# Patient Record
Sex: MALE | Race: WHITE | NOT HISPANIC OR LATINO | Employment: FULL TIME | URBAN - METROPOLITAN AREA
[De-identification: names, ages, dates, MRNs, and addresses within clinical notes are randomized per-mention and may not be internally consistent; named-entity substitution may affect disease eponyms.]

---

## 2019-02-01 ENCOUNTER — NON-PROVIDER VISIT (OUTPATIENT)
Dept: URGENT CARE | Facility: CLINIC | Age: 34
End: 2019-02-01
Payer: COMMERCIAL

## 2019-02-01 DIAGNOSIS — Z02.1 PRE-EMPLOYMENT DRUG SCREENING: ICD-10-CM

## 2019-02-01 LAB
AMP AMPHETAMINE: NORMAL
COC COCAINE: NORMAL
INT CON NEG: NORMAL
INT CON POS: NORMAL
MET METHAMPHETAMINES: NORMAL
OPI OPIATES: NORMAL
PCP PHENCYCLIDINE: NORMAL
POC DRUG COMMENT 753798-OCCUPATIONAL HEALTH: NEGATIVE
THC: NORMAL

## 2019-02-01 PROCEDURE — 80305 DRUG TEST PRSMV DIR OPT OBS: CPT | Performed by: PHYSICIAN ASSISTANT

## 2019-07-06 ENCOUNTER — APPOINTMENT (OUTPATIENT)
Dept: RADIOLOGY | Facility: MEDICAL CENTER | Age: 34
DRG: 153 | End: 2019-07-06
Attending: EMERGENCY MEDICINE
Payer: COMMERCIAL

## 2019-07-06 ENCOUNTER — HOSPITAL ENCOUNTER (INPATIENT)
Facility: MEDICAL CENTER | Age: 34
LOS: 1 days | DRG: 153 | End: 2019-07-07
Attending: EMERGENCY MEDICINE | Admitting: FAMILY MEDICINE
Payer: COMMERCIAL

## 2019-07-06 LAB
BASOPHILS # BLD AUTO: 0.3 % (ref 0–1.8)
BASOPHILS # BLD: 0.06 K/UL (ref 0–0.12)
EOSINOPHIL # BLD AUTO: 0.02 K/UL (ref 0–0.51)
EOSINOPHIL NFR BLD: 0.1 % (ref 0–6.9)
ERYTHROCYTE [DISTWIDTH] IN BLOOD BY AUTOMATED COUNT: 39.3 FL (ref 35.9–50)
HCT VFR BLD AUTO: 39.3 % (ref 42–52)
HGB BLD-MCNC: 14 G/DL (ref 14–18)
IMM GRANULOCYTES # BLD AUTO: 0.15 K/UL (ref 0–0.11)
IMM GRANULOCYTES NFR BLD AUTO: 0.8 % (ref 0–0.9)
LYMPHOCYTES # BLD AUTO: 1.1 K/UL (ref 1–4.8)
LYMPHOCYTES NFR BLD: 5.6 % (ref 22–41)
MCH RBC QN AUTO: 31.1 PG (ref 27–33)
MCHC RBC AUTO-ENTMCNC: 35.6 G/DL (ref 33.7–35.3)
MCV RBC AUTO: 87.3 FL (ref 81.4–97.8)
MONOCYTES # BLD AUTO: 2.33 K/UL (ref 0–0.85)
MONOCYTES NFR BLD AUTO: 11.9 % (ref 0–13.4)
NEUTROPHILS # BLD AUTO: 15.94 K/UL (ref 1.82–7.42)
NEUTROPHILS NFR BLD: 81.3 % (ref 44–72)
NRBC # BLD AUTO: 0 K/UL
NRBC BLD-RTO: 0 /100 WBC
PLATELET # BLD AUTO: 342 K/UL (ref 164–446)
PMV BLD AUTO: 9.6 FL (ref 9–12.9)
RBC # BLD AUTO: 4.5 M/UL (ref 4.7–6.1)
WBC # BLD AUTO: 19.6 K/UL (ref 4.8–10.8)

## 2019-07-06 PROCEDURE — 96365 THER/PROPH/DIAG IV INF INIT: CPT

## 2019-07-06 PROCEDURE — 85025 COMPLETE CBC W/AUTO DIFF WBC: CPT

## 2019-07-06 PROCEDURE — 10160 PNXR ASPIR ABSC HMTMA BULLA: CPT

## 2019-07-06 PROCEDURE — 700111 HCHG RX REV CODE 636 W/ 250 OVERRIDE (IP): Performed by: EMERGENCY MEDICINE

## 2019-07-06 PROCEDURE — 96375 TX/PRO/DX INJ NEW DRUG ADDON: CPT

## 2019-07-06 PROCEDURE — 99285 EMERGENCY DEPT VISIT HI MDM: CPT

## 2019-07-06 PROCEDURE — 0CJY3ZZ INSPECTION OF MOUTH AND THROAT, PERCUTANEOUS APPROACH: ICD-10-PCS | Performed by: SPECIALIST

## 2019-07-06 PROCEDURE — 80048 BASIC METABOLIC PNL TOTAL CA: CPT

## 2019-07-06 RX ORDER — BUPROPION HYDROCHLORIDE 75 MG/1
75 TABLET ORAL 2 TIMES DAILY
Status: ON HOLD | COMMUNITY
End: 2019-07-07

## 2019-07-06 RX ORDER — ONDANSETRON 2 MG/ML
INJECTION INTRAMUSCULAR; INTRAVENOUS
Status: DISPENSED
Start: 2019-07-06 | End: 2019-07-07

## 2019-07-06 RX ORDER — KETOROLAC TROMETHAMINE 30 MG/ML
15 INJECTION, SOLUTION INTRAMUSCULAR; INTRAVENOUS ONCE
Status: COMPLETED | OUTPATIENT
Start: 2019-07-07 | End: 2019-07-06

## 2019-07-06 RX ORDER — FLUOXETINE 10 MG/1
80 CAPSULE ORAL DAILY
Status: ON HOLD | COMMUNITY
End: 2019-07-07

## 2019-07-06 RX ORDER — ONDANSETRON 2 MG/ML
4 INJECTION INTRAMUSCULAR; INTRAVENOUS ONCE
Status: COMPLETED | OUTPATIENT
Start: 2019-07-07 | End: 2019-07-06

## 2019-07-06 RX ORDER — KETOROLAC TROMETHAMINE 30 MG/ML
INJECTION, SOLUTION INTRAMUSCULAR; INTRAVENOUS
Status: DISPENSED
Start: 2019-07-06 | End: 2019-07-07

## 2019-07-06 RX ORDER — DEXAMETHASONE SODIUM PHOSPHATE 10 MG/ML
10 INJECTION, SOLUTION INTRAMUSCULAR; INTRAVENOUS ONCE
Status: COMPLETED | OUTPATIENT
Start: 2019-07-07 | End: 2019-07-06

## 2019-07-06 RX ADMIN — ONDANSETRON HYDROCHLORIDE 4 MG: 2 INJECTION, SOLUTION INTRAMUSCULAR; INTRAVENOUS at 23:52

## 2019-07-06 RX ADMIN — KETOROLAC TROMETHAMINE 15 MG: 30 INJECTION, SOLUTION INTRAMUSCULAR at 23:47

## 2019-07-06 RX ADMIN — DEXAMETHASONE SODIUM PHOSPHATE 10 MG: 10 INJECTION INTRAMUSCULAR; INTRAVENOUS at 23:46

## 2019-07-07 VITALS
DIASTOLIC BLOOD PRESSURE: 53 MMHG | TEMPERATURE: 98.6 F | HEIGHT: 67 IN | BODY MASS INDEX: 31.28 KG/M2 | WEIGHT: 199.3 LBS | RESPIRATION RATE: 20 BRPM | HEART RATE: 88 BPM | SYSTOLIC BLOOD PRESSURE: 145 MMHG | OXYGEN SATURATION: 97 %

## 2019-07-07 PROBLEM — E86.0 DEHYDRATION: Status: ACTIVE | Noted: 2019-07-07

## 2019-07-07 PROBLEM — J36 ABSCESS OF TONSIL: Status: ACTIVE | Noted: 2019-07-07

## 2019-07-07 PROBLEM — F32.A DEPRESSION: Status: ACTIVE | Noted: 2019-07-07

## 2019-07-07 LAB
ALBUMIN SERPL BCP-MCNC: 3.7 G/DL (ref 3.2–4.9)
ANION GAP SERPL CALC-SCNC: 11 MMOL/L (ref 0–11.9)
BASOPHILS # BLD AUTO: 0.2 % (ref 0–1.8)
BASOPHILS # BLD: 0.04 K/UL (ref 0–0.12)
BUN SERPL-MCNC: 10 MG/DL (ref 8–22)
BUN SERPL-MCNC: 6 MG/DL (ref 8–22)
CALCIUM SERPL-MCNC: 9 MG/DL (ref 8.4–10.2)
CALCIUM SERPL-MCNC: 9.3 MG/DL (ref 8.4–10.2)
CHLORIDE SERPL-SCNC: 101 MMOL/L (ref 96–112)
CHLORIDE SERPL-SCNC: 102 MMOL/L (ref 96–112)
CO2 SERPL-SCNC: 23 MMOL/L (ref 20–33)
CO2 SERPL-SCNC: 26 MMOL/L (ref 20–33)
CREAT SERPL-MCNC: 0.9 MG/DL (ref 0.5–1.4)
CREAT SERPL-MCNC: 1.01 MG/DL (ref 0.5–1.4)
EOSINOPHIL # BLD AUTO: 0 K/UL (ref 0–0.51)
EOSINOPHIL NFR BLD: 0 % (ref 0–6.9)
ERYTHROCYTE [DISTWIDTH] IN BLOOD BY AUTOMATED COUNT: 39.6 FL (ref 35.9–50)
GLUCOSE SERPL-MCNC: 115 MG/DL (ref 65–99)
GLUCOSE SERPL-MCNC: 187 MG/DL (ref 65–99)
HCT VFR BLD AUTO: 40.8 % (ref 42–52)
HGB BLD-MCNC: 14.2 G/DL (ref 14–18)
IMM GRANULOCYTES # BLD AUTO: 0.25 K/UL (ref 0–0.11)
IMM GRANULOCYTES NFR BLD AUTO: 1.2 % (ref 0–0.9)
LYMPHOCYTES # BLD AUTO: 0.45 K/UL (ref 1–4.8)
LYMPHOCYTES NFR BLD: 2.1 % (ref 22–41)
MCH RBC QN AUTO: 30.7 PG (ref 27–33)
MCHC RBC AUTO-ENTMCNC: 34.8 G/DL (ref 33.7–35.3)
MCV RBC AUTO: 88.1 FL (ref 81.4–97.8)
MONOCYTES # BLD AUTO: 0.82 K/UL (ref 0–0.85)
MONOCYTES NFR BLD AUTO: 3.8 % (ref 0–13.4)
NEUTROPHILS # BLD AUTO: 20.01 K/UL (ref 1.82–7.42)
NEUTROPHILS NFR BLD: 92.7 % (ref 44–72)
NRBC # BLD AUTO: 0 K/UL
NRBC BLD-RTO: 0 /100 WBC
PHOSPHATE SERPL-MCNC: 1.2 MG/DL (ref 2.5–4.5)
PLATELET # BLD AUTO: 356 K/UL (ref 164–446)
PMV BLD AUTO: 9.6 FL (ref 9–12.9)
POTASSIUM SERPL-SCNC: 3.2 MMOL/L (ref 3.6–5.5)
POTASSIUM SERPL-SCNC: 3.8 MMOL/L (ref 3.6–5.5)
RBC # BLD AUTO: 4.63 M/UL (ref 4.7–6.1)
S PYO AG THROAT QL: NORMAL
SIGNIFICANT IND 70042: NORMAL
SITE SITE: NORMAL
SODIUM SERPL-SCNC: 135 MMOL/L (ref 135–145)
SODIUM SERPL-SCNC: 138 MMOL/L (ref 135–145)
SOURCE SOURCE: NORMAL
WBC # BLD AUTO: 21.6 K/UL (ref 4.8–10.8)

## 2019-07-07 PROCEDURE — 94760 N-INVAS EAR/PLS OXIMETRY 1: CPT

## 2019-07-07 PROCEDURE — 770006 HCHG ROOM/CARE - MED/SURG/GYN SEMI*

## 2019-07-07 PROCEDURE — 700111 HCHG RX REV CODE 636 W/ 250 OVERRIDE (IP): Performed by: EMERGENCY MEDICINE

## 2019-07-07 PROCEDURE — 80069 RENAL FUNCTION PANEL: CPT

## 2019-07-07 PROCEDURE — 700117 HCHG RX CONTRAST REV CODE 255: Performed by: EMERGENCY MEDICINE

## 2019-07-07 PROCEDURE — 87081 CULTURE SCREEN ONLY: CPT

## 2019-07-07 PROCEDURE — 700105 HCHG RX REV CODE 258: Performed by: FAMILY MEDICINE

## 2019-07-07 PROCEDURE — 99234 HOSP IP/OBS SM DT SF/LOW 45: CPT | Performed by: FAMILY MEDICINE

## 2019-07-07 PROCEDURE — 85025 COMPLETE CBC W/AUTO DIFF WBC: CPT

## 2019-07-07 PROCEDURE — A9270 NON-COVERED ITEM OR SERVICE: HCPCS | Performed by: FAMILY MEDICINE

## 2019-07-07 PROCEDURE — 700101 HCHG RX REV CODE 250: Performed by: FAMILY MEDICINE

## 2019-07-07 PROCEDURE — 700102 HCHG RX REV CODE 250 W/ 637 OVERRIDE(OP)

## 2019-07-07 PROCEDURE — 700105 HCHG RX REV CODE 258: Performed by: EMERGENCY MEDICINE

## 2019-07-07 PROCEDURE — A9270 NON-COVERED ITEM OR SERVICE: HCPCS

## 2019-07-07 PROCEDURE — 700102 HCHG RX REV CODE 250 W/ 637 OVERRIDE(OP): Performed by: FAMILY MEDICINE

## 2019-07-07 PROCEDURE — 700101 HCHG RX REV CODE 250

## 2019-07-07 PROCEDURE — 70491 CT SOFT TISSUE NECK W/DYE: CPT

## 2019-07-07 PROCEDURE — 87880 STREP A ASSAY W/OPTIC: CPT

## 2019-07-07 PROCEDURE — 700111 HCHG RX REV CODE 636 W/ 250 OVERRIDE (IP): Performed by: FAMILY MEDICINE

## 2019-07-07 RX ORDER — SODIUM CHLORIDE AND POTASSIUM CHLORIDE 150; 900 MG/100ML; MG/100ML
INJECTION, SOLUTION INTRAVENOUS CONTINUOUS
Status: DISCONTINUED | OUTPATIENT
Start: 2019-07-07 | End: 2019-07-07 | Stop reason: HOSPADM

## 2019-07-07 RX ORDER — MORPHINE SULFATE 4 MG/ML
2 INJECTION, SOLUTION INTRAMUSCULAR; INTRAVENOUS EVERY 4 HOURS PRN
Status: DISCONTINUED | OUTPATIENT
Start: 2019-07-07 | End: 2019-07-07 | Stop reason: HOSPADM

## 2019-07-07 RX ORDER — FLUOXETINE HYDROCHLORIDE 20 MG/1
20 CAPSULE ORAL 2 TIMES DAILY
COMMUNITY
End: 2020-05-08

## 2019-07-07 RX ORDER — PREDNISONE 20 MG/1
40 TABLET ORAL DAILY
Qty: 10 TAB | Refills: 0 | Status: SHIPPED | OUTPATIENT
Start: 2019-07-07 | End: 2019-07-12

## 2019-07-07 RX ORDER — NAPROXEN SODIUM 220 MG
440 TABLET ORAL PRN
COMMUNITY
End: 2020-05-08

## 2019-07-07 RX ORDER — LIDOCAINE HYDROCHLORIDE AND EPINEPHRINE 10; 10 MG/ML; UG/ML
INJECTION, SOLUTION INFILTRATION; PERINEURAL
Status: COMPLETED
Start: 2019-07-07 | End: 2019-07-07

## 2019-07-07 RX ORDER — BUPROPION HYDROCHLORIDE 300 MG/1
300 TABLET ORAL EVERY MORNING
COMMUNITY
End: 2020-05-08

## 2019-07-07 RX ORDER — LIDOCAINE HYDROCHLORIDE AND EPINEPHRINE 10; 10 MG/ML; UG/ML
10 INJECTION, SOLUTION INFILTRATION; PERINEURAL ONCE
Status: COMPLETED | OUTPATIENT
Start: 2019-07-07 | End: 2019-07-07

## 2019-07-07 RX ORDER — DEXAMETHASONE SODIUM PHOSPHATE 4 MG/ML
6 INJECTION, SOLUTION INTRA-ARTICULAR; INTRALESIONAL; INTRAMUSCULAR; INTRAVENOUS; SOFT TISSUE EVERY 6 HOURS
Status: DISCONTINUED | OUTPATIENT
Start: 2019-07-07 | End: 2019-07-07 | Stop reason: HOSPADM

## 2019-07-07 RX ORDER — ENALAPRILAT 1.25 MG/ML
1.25 INJECTION INTRAVENOUS EVERY 6 HOURS PRN
Status: DISCONTINUED | OUTPATIENT
Start: 2019-07-07 | End: 2019-07-07 | Stop reason: HOSPADM

## 2019-07-07 RX ORDER — FLUOXETINE HYDROCHLORIDE 20 MG/1
80 CAPSULE ORAL DAILY
Status: DISCONTINUED | OUTPATIENT
Start: 2019-07-07 | End: 2019-07-07 | Stop reason: HOSPADM

## 2019-07-07 RX ORDER — AMOXICILLIN AND CLAVULANATE POTASSIUM 875; 125 MG/1; MG/1
1 TABLET, FILM COATED ORAL 2 TIMES DAILY
Qty: 14 TAB | Refills: 0 | Status: SHIPPED | OUTPATIENT
Start: 2019-07-07 | End: 2019-07-14

## 2019-07-07 RX ORDER — ATORVASTATIN CALCIUM 40 MG/1
40 TABLET, FILM COATED ORAL DAILY
COMMUNITY

## 2019-07-07 RX ORDER — BUPROPION HYDROCHLORIDE 75 MG/1
75 TABLET ORAL 2 TIMES DAILY
Status: DISCONTINUED | OUTPATIENT
Start: 2019-07-07 | End: 2019-07-07 | Stop reason: HOSPADM

## 2019-07-07 RX ORDER — ACETAMINOPHEN 325 MG/1
650 TABLET ORAL EVERY 6 HOURS PRN
Status: DISCONTINUED | OUTPATIENT
Start: 2019-07-07 | End: 2019-07-07 | Stop reason: HOSPADM

## 2019-07-07 RX ADMIN — DEXAMETHASONE SODIUM PHOSPHATE 6 MG: 4 INJECTION INTRA-ARTICULAR; INTRALESIONAL; INTRAMUSCULAR; INTRAVENOUS; SOFT TISSUE at 05:07

## 2019-07-07 RX ADMIN — FLUOXETINE 80 MG: 20 CAPSULE ORAL at 05:04

## 2019-07-07 RX ADMIN — LIDOCAINE HYDROCHLORIDE AND EPINEPHRINE 10 ML: 10; 10 INJECTION, SOLUTION INFILTRATION; PERINEURAL at 01:15

## 2019-07-07 RX ADMIN — AMPICILLIN AND SULBACTAM 3 G: 1; 2 INJECTION, POWDER, FOR SOLUTION INTRAMUSCULAR; INTRAVENOUS at 00:04

## 2019-07-07 RX ADMIN — POTASSIUM CHLORIDE AND SODIUM CHLORIDE: 900; 150 INJECTION, SOLUTION INTRAVENOUS at 03:37

## 2019-07-07 RX ADMIN — AMPICILLIN SODIUM AND SULBACTAM SODIUM 3 G: 2; 1 INJECTION, POWDER, FOR SOLUTION INTRAMUSCULAR; INTRAVENOUS at 05:05

## 2019-07-07 RX ADMIN — IOHEXOL 80 ML: 350 INJECTION, SOLUTION INTRAVENOUS at 00:23

## 2019-07-07 RX ADMIN — BENZOCAINE, BUTAMBEN, AND TETRACAINE HYDROCHLORIDE 1 SPRAY: .028; .004; .004 AEROSOL, SPRAY TOPICAL at 01:15

## 2019-07-07 RX ADMIN — BUPROPION HYDROCHLORIDE 75 MG: 75 TABLET, FILM COATED ORAL at 05:05

## 2019-07-07 ASSESSMENT — LIFESTYLE VARIABLES
HAVE YOU EVER FELT YOU SHOULD CUT DOWN ON YOUR DRINKING: YES
TOTAL SCORE: 2
ALCOHOL_USE: YES
DOES PATIENT WANT TO TALK TO SOMEONE ABOUT QUITTING: NO
EVER_SMOKED: YES
DOES PATIENT WANT TO STOP DRINKING: NO
ON A TYPICAL DAY WHEN YOU DRINK ALCOHOL HOW MANY DRINKS DO YOU HAVE: 6
HAVE PEOPLE ANNOYED YOU BY CRITICIZING YOUR DRINKING: YES
TOTAL SCORE: 2
EVER FELT BAD OR GUILTY ABOUT YOUR DRINKING: NO
TOTAL SCORE: 2
EVER HAD A DRINK FIRST THING IN THE MORNING TO STEADY YOUR NERVES TO GET RID OF A HANGOVER: NO
AVERAGE NUMBER OF DAYS PER WEEK YOU HAVE A DRINK CONTAINING ALCOHOL: 7
HOW MANY TIMES IN THE PAST YEAR HAVE YOU HAD 5 OR MORE DRINKS IN A DAY: 360
CONSUMPTION TOTAL: POSITIVE

## 2019-07-07 ASSESSMENT — COGNITIVE AND FUNCTIONAL STATUS - GENERAL
SUGGESTED CMS G CODE MODIFIER DAILY ACTIVITY: CH
SUGGESTED CMS G CODE MODIFIER MOBILITY: CH
MOBILITY SCORE: 24
DAILY ACTIVITIY SCORE: 24

## 2019-07-07 ASSESSMENT — PATIENT HEALTH QUESTIONNAIRE - PHQ9
6. FEELING BAD ABOUT YOURSELF - OR THAT YOU ARE A FAILURE OR HAVE LET YOURSELF OR YOUR FAMILY DOWN: SEVERAL DAYS
9. THOUGHTS THAT YOU WOULD BE BETTER OFF DEAD, OR OF HURTING YOURSELF: NOT AT ALL
SUM OF ALL RESPONSES TO PHQ9 QUESTIONS 1 AND 2: 2
8. MOVING OR SPEAKING SO SLOWLY THAT OTHER PEOPLE COULD HAVE NOTICED. OR THE OPPOSITE, BEING SO FIGETY OR RESTLESS THAT YOU HAVE BEEN MOVING AROUND A LOT MORE THAN USUAL: NOT AT ALL
5. POOR APPETITE OR OVEREATING: NOT AT ALL
4. FEELING TIRED OR HAVING LITTLE ENERGY: NEARLY EVERY DAY
7. TROUBLE CONCENTRATING ON THINGS, SUCH AS READING THE NEWSPAPER OR WATCHING TELEVISION: NOT AT ALL
2. FEELING DOWN, DEPRESSED, IRRITABLE, OR HOPELESS: SEVERAL DAYS
1. LITTLE INTEREST OR PLEASURE IN DOING THINGS: SEVERAL DAYS
3. TROUBLE FALLING OR STAYING ASLEEP OR SLEEPING TOO MUCH: SEVERAL DAYS
SUM OF ALL RESPONSES TO PHQ QUESTIONS 1-9: 7

## 2019-07-07 NOTE — PROGRESS NOTES
"2 RN skin check complete with BINA Felipe.   Devices in place n/a.  Skin assessed under devices n/a.  Confirmed pressure ulcers found on n/a.  New potential pressure ulcers noted on n/a. Wound consult placed n/a.  The following interventions in place encouraged ambulation and frequent turning in bed*    Pt skin is clean, dry, and intact. Pt reports a \"heat rash\" from his belt on his groin. Skin is red and intact. Pt has an abscess on the left side of his neck that can be felt. There are no other areas of concern.   "

## 2019-07-07 NOTE — H&P
DATE OF ADMISSION:  07/07/2019    HISTORY OF PRESENT ILLNESS:  This is a 34-year-old male who comes to the   emergency room with a complaint of sore throat.  Patient states that it   started about 3 days ago.  The patient states that he is unable to eat or   drink much.  The patient also states that he has had fever and chills.  Fever   is subjective.  In the emergency room, patient was diagnosed with tonsillar   abscess and ENT consultation was done.  ENT physician had tried to drain the   abscess, but was not very successful and apparently the patient has a phlegmon   instead of an abscess on the tonsil.    PAST MEDICAL HISTORY:  Depression.    MEDICATIONS:  On the day of admission:  1.  Fluoxetine 10 mg p.o. daily.  2.  Wellbutrin is 75 mg p.o. 2 times a day.    SOCIAL HISTORY:  Admits to smoking marijuana.  Also drinks about 6 packs of   beer per day; however, has not drunk for the last couple of days, denies any   drug use.    PAST SURGICAL HISTORY:  None.    FAMILY HISTORY:  Reviewed and not significant.    REVIEW OF SYSTEMS:  All 14 systems reviewed, all of them are negative except   for what I have mentioned in the history of present illness.    PHYSICAL EXAMINATION:  VITAL SIGNS:  Temperature of 37.5, pulse of 95, respiratory rate of 20, blood   pressure 155/103, and oxygen saturation 96%.  GENERAL:  The patient is awake, alert, oriented x3, no acute distress.  HEAD AND NECK:  Neck is supple.  Lips are dry.  Lymphadenopathy noted in the   neck:  Also tonsils are enlarged.  CARDIOVASCULAR:  S1, S2 regular.  LUNGS:  Clear bilaterally.  No wheezing or crackles.  ABDOMEN:  Soft and nontender.  LOWER EXTREMITIES:  No edema or rash noted.    LABORATORY DATA:  WBC of 19.6, H and H of 14 and 39.3, platelets of 342,000.    Sodium is 135, potassium 3.2, chloride 101, bicarbonate 23, BUN of 10, and   creatinine of 1.    IMAGING:  CT of the neck was done showed acute left palatine tonsillitis with   a large 4.3 cm left  peritonsillar abscess, obliteration of the nasopharyngeal   airway at the level of the abscess.  Oropharyngeal airway is patent, extensive   retropharyngeal edema, phlegmon extending down to the level of the C4-C5   stopping; neuropathy at the level of the vocal cord.  No extension into the   superior mediastinum at this time.    ASSESSMENT AND PLAN:  This is a 34-year-old male with a peritonsillar abscess:  1.  ENT has been consulted.  2.  Status post attempt to drain the abscess.  3.  Continue with Unasyn 3 g IV every 6 hours.  4.  Dexamethasone 60 mg IV every 6 hours.    For dehydration:  IV fluids, normal saline at 100 mL an hour.    For hypokalemia:  The patient will be receiving KCl.    For depression:  Continue with home medications.    For deep venous thrombosis prophylaxis, the patient has compression boots.       ____________________________________     Marvin Hawk MD    [facility] / [facility]    DD:  07/07/2019 02:26:39  DT:  07/07/2019 04:22:05    D#:  [id number]  Job#:  [id number]

## 2019-07-07 NOTE — OP REPORT
DATE OF SERVICE:  07/07/2019    SURGEON:  Ulises Hyatt MD    ASSISTANT:  None.    ANESTHESIA:  Local, using 1% Xylocaine with epinephrine.    PREOPERATIVE DIAGNOSIS:  Tonsillar abscess.    POSTOPERATIVE DIAGNOSIS:  Tonsillar abscess.    NAME OF THE PROCEDURE:  Incision and drainage of tonsillar abscess, attempted,   but incomplete.    INDICATIONS:  Patient has lucencies present in the left tonsillar area   associated with severe acute tonsillitis suggestive of possible abscess   formation.  I discussed incision and drainage with the patient and consent was   given at the bedside.    DESCRIPTION:  The patient was placed in a seated position in the ER [facility].    Cetacaine spray was applied topically to the pharynx.  Approximately 2-2.5 mL   of 1% Xylocaine with 1:100,000 units of epinephrine solution was used to   infiltrate along the left soft palate and anterior tonsillar pillar area as   well as along the mid tonsil area.    An 18-gauge needle was then used with a 10 mL syringe to serially aspirate   along the soft palate, anterior tonsillar pillar, and central aspect of the   tonsil.  No purulence is noted with any attempted aspiration, although some   purulent discharge is noted from the tonsil itself intermittently while   suctioning.  After multiple aspiration attempts, I elected to not proceed with   incision at this point as the patient does not have trismus and there appears   to be some drainage of purulent material from the tonsil.  Additionally, the   CT scan did not show a distinct peritonsillar abscess or other tonsillar   lucencies.       ____________________________________     ULISES HYATT MD    [facility] / [facility]    DD:  07/07/2019 01:42:58  DT:  07/07/2019 04:31:08    D#:  [id number]  Job#:  [id number]

## 2019-07-07 NOTE — ED TRIAGE NOTES
Pt reports tonsilar and neck swelling for the past three days. Pt reports hx of multiple strep throat infections. Pain significantly  Increased tonight, especially when swallowing. Denies SOB. Pt reports intermittent chills. Denies fevers at home.

## 2019-07-07 NOTE — ED PROVIDER NOTES
ED Provider Note    CHIEF COMPLAINT  Chief Complaint   Patient presents with   • Sore Throat     x 3 days, airway intact   • Neck Swelling       HPI  Kendall Eckert is a 34 y.o. male who presents with sore throat for the last 3 days.  He also reports some recent mild changes in his voice.  He reports that it is very difficult to swallow.  He denies any difficulty breathing.  He reports subjective fever.  Patient reports recurrent strep infections as a child.  Is otherwise healthy.    REVIEW OF SYSTEMS  ROS  See HPI for further details. All other systems are negative.     PAST MEDICAL HISTORY   has a past medical history of Depression and Hyperlipidemia.    SOCIAL HISTORY  Social History     Social History Main Topics   • Smoking status: Never Smoker   • Smokeless tobacco: Never Used   • Alcohol use Yes   • Drug use: Yes     Types: Inhaled      Comment: marijuana   • Sexual activity: Not on file       SURGICAL HISTORY  patient denies any surgical history    CURRENT MEDICATIONS  Home Medications     Reviewed by Kaveh Darden R.N. (Registered Nurse) on 07/06/19 at 2316  Med List Status: Partial   Medication Last Dose Status   buPROPion (WELLBUTRIN) 75 MG Tab  Active   FLUoxetine (PROZAC) 10 MG Cap  Active                ALLERGIES  Not on File    PHYSICAL EXAM  Physical Exam   Constitutional: He is oriented to person, place, and time. He appears well-developed and well-nourished.   HENT:   No trismus, no submandibular fullness, mild muffled voice, fullness in the left peritonsillar space with associated erythema and associated exudative pharyngitis which is bilateral.   Eyes: Conjunctivae are normal.   Neck: Normal range of motion. Neck supple.   Pulmonary/Chest: Effort normal.   Neurological: He is alert and oriented to person, place, and time.   Skin: Skin is warm.   Psychiatric: He has a normal mood and affect. His behavior is normal.     Results for orders placed or performed during the hospital encounter of  07/06/19   CBC WITH DIFFERENTIAL   Result Value Ref Range    WBC 19.6 (H) 4.8 - 10.8 K/uL    RBC 4.50 (L) 4.70 - 6.10 M/uL    Hemoglobin 14.0 14.0 - 18.0 g/dL    Hematocrit 39.3 (L) 42.0 - 52.0 %    MCV 87.3 81.4 - 97.8 fL    MCH 31.1 27.0 - 33.0 pg    MCHC 35.6 (H) 33.7 - 35.3 g/dL    RDW 39.3 35.9 - 50.0 fL    Platelet Count 342 164 - 446 K/uL    MPV 9.6 9.0 - 12.9 fL    Neutrophils-Polys 81.30 (H) 44.00 - 72.00 %    Lymphocytes 5.60 (L) 22.00 - 41.00 %    Monocytes 11.90 0.00 - 13.40 %    Eosinophils 0.10 0.00 - 6.90 %    Basophils 0.30 0.00 - 1.80 %    Immature Granulocytes 0.80 0.00 - 0.90 %    Nucleated RBC 0.00 /100 WBC    Neutrophils (Absolute) 15.94 (H) 1.82 - 7.42 K/uL    Lymphs (Absolute) 1.10 1.00 - 4.80 K/uL    Monos (Absolute) 2.33 (H) 0.00 - 0.85 K/uL    Eos (Absolute) 0.02 0.00 - 0.51 K/uL    Baso (Absolute) 0.06 0.00 - 0.12 K/uL    Immature Granulocytes (abs) 0.15 (H) 0.00 - 0.11 K/uL    NRBC (Absolute) 0.00 K/uL   Basic Metabolic Panel   Result Value Ref Range    Sodium 135 135 - 145 mmol/L    Potassium 3.2 (L) 3.6 - 5.5 mmol/L    Chloride 101 96 - 112 mmol/L    Co2 23 20 - 33 mmol/L    Glucose 115 (H) 65 - 99 mg/dL    Bun 10 8 - 22 mg/dL    Creatinine 1.01 0.50 - 1.40 mg/dL    Calcium 9.0 8.4 - 10.2 mg/dL    Anion Gap 11.0 0.0 - 11.9   ESTIMATED GFR   Result Value Ref Range    GFR If African American >60 >60 mL/min/1.73 m 2    GFR If Non African American >60 >60 mL/min/1.73 m 2   RAPID STREP, CULT IF INDICATED (CULTURE IF NEGATIVE)   Result Value Ref Range    Significant Indicator NEG     Source THRT     Site THROAT     Rapid Strep Screen       Negative for Group A streptococcus.  A negative result may be obtained if the specimen is  inadequate or antigen concentration is below the  sensitivity of the test. This negative test will be followed  up with a culture as requested.       CT-SOFT TISSUE NECK WITH   Final Result      1.  Acute left palatine tonsillitis with a large 4.3 cm left  peritonsillar abscess.   2.  Obliteration of the nasopharyngeal airway at the level of the abscess. Oropharyngeal airway is patent.   3.  Extensive retropharyngeal edema/phlegmon extending down to the level of C4-C5, stopping roughly at the level of the vocal cords. No extension into the superior mediastinum at this time.      Findings were discussed with RICHARDSON DE LEON on 7/7/2019 12:42 AM.            COURSE & MEDICAL DECISION MAKING  Pertinent Labs & Imaging studies reviewed. (See chart for details)    Patient here with exam findings most consistent with peritonsillar abscess.  Will CT for verification and to assist with procedural drainage.  Giving dexamethasone, Unasyn, and Toradol.    Patient feeling improved following treatment.    CT reveals very large peritonsillar abscess with complete obliteration of the nasopharyngeal space, there is also suggested involvement of the retropharyngeal space down to the level of the vocal cords.  Given the extensive burden of infection I have discussed this case with ENT, they will perform bedside drainage given this is a complicated peritonsillar abscess.    ENT is at bedside, after multiple attempts they are unable to drain any pus, suspect possible phlegmon over abscess.  They would like patient admitted for IV antibiotics and continued observation.  I will discuss case with hospitalist.  Patient will continue to receive Unasyn.    FINAL IMPRESSION  1.  Tonsillar abscess, retropharyngeal phlegmon         Electronically signed by: Richardson De Leon, 7/6/2019 11:38 PM

## 2019-07-07 NOTE — CARE PLAN
Problem: Safety  Goal: Will remain free from injury  Outcome: PROGRESSING AS EXPECTED  Reinforced fall risk precautions. Non skid socks on feet, call light and personal belongings within reach. Pt is up self. Bed is in lowest and locked position. Oriented to the room.     Problem: Infection  Goal: Will remain free from infection  Outcome: PROGRESSING AS EXPECTED  Hand hygiene used before and after entering pt room. Clean technique used when passing meds. Educated pt on personal hygiene.

## 2019-07-07 NOTE — DISCHARGE SUMMARY
Discharge Summary    CHIEF COMPLAINT ON ADMISSION  Chief Complaint   Patient presents with   • Sore Throat     x 3 days, airway intact   • Neck Swelling       Reason for Admission  swollen neck     Admission Date  7/6/2019    CODE STATUS  Full    HPI & HOSPITAL COURSE  This is a 34 y.o. male with a history of tobacco and heavy alcohol use, hyperlipidemia and depression here with sore throat and difficulty swallowing found to have a peritonsillar abscess.  ENT was consulted after CT scan revealed a localized fluid collection.  The patient was started on IV dexamethasone as well as IV Unasyn.  He was taken for drainage of the abscess however after several attempts at drainage, it did not appear that there was a large fluid collection therefore the procedure was aborted.  There was some spontaneous drainage of the area and following the surgery, the patient stated he had multiple episodes of coughing where he was able to bring up purulent drainage.  This decreased after several hours and he felt significantly improved.  The pain in his throat had nearly resolved and he had no difficult he swallowing.  His diet was advanced to clear liquids and then full liquids and was well-tolerated.  He was very anxious to discharge and although was recommended a full 24 hours of IV antibiotic therapy, he insisted that he felt well enough to go home to complete oral antibiotics.  Due to his rapid clinical improvement that was greater than expected based on his initial presentation, he was felt to be stable to discharge home to complete his oral antibiotics.  An outpatient appointment was made for him to follow-up with oral surgery.       Therefore, he is discharged in fair and stable condition to home with close outpatient follow-up.    The patient recovered much more quickly than anticipated on admission.    Discharge Date  7/7/2019    FOLLOW UP ITEMS POST DISCHARGE  Follow-up with oral surgery as scheduled    DISCHARGE  DIAGNOSES  Active Problems:    Abscess of tonsil POA: Unknown    Dehydration POA: Unknown    Depression POA: Unknown  Resolved Problems:    * No resolved hospital problems. *      FOLLOW UP  Ulises Hyatt M.D.  [address] [facility]  [place] [address]  [phone]    Schedule an appointment as soon as possible for a visit in 1 week  For wound re-check    [address]  [place] [id number] [phone]  Schedule an appointment as soon as possible for a visit in 1 week  For wound re-check, As needed, If symptoms worsen      MEDICATIONS ON DISCHARGE     Medication List      START taking these medications      Instructions   amoxicillin-clavulanate 875-125 MG Tabs  Commonly known as:  AUGMENTIN   Take 1 Tab by mouth 2 times a day for 7 days.  Dose:  1 Tab     predniSONE 20 MG Tabs  Commonly known as:  DELTASONE   Take 2 Tabs by mouth every day for 5 days.  Dose:  40 mg        CONTINUE taking these medications      Instructions   ALEVE 220 MG tablet  Generic drug:  naproxen   Take 440 mg by mouth as needed.  Dose:  440 mg     atorvastatin 40 MG Tabs  Commonly known as:  LIPITOR   Take 40 mg by mouth every day.  Dose:  40 mg     FLUoxetine 20 MG Caps  Commonly known as:  PROZAC   Take 20 mg by mouth 2 times a day.  Dose:  20 mg     WELLBUTRIN  MG XL tablet  Generic drug:  buPROPion   Take 300 mg by mouth every morning.  Dose:  300 mg            Allergies  No Known Allergies    DIET  No orders of the defined types were placed in this encounter.      ACTIVITY  As tolerated.  Weight bearing as tolerated    CONSULTATIONS  Dr. Hyatt, oral surgery    PROCEDURES  7/7/2019, drainage of tonsillar abscess attempted but incomplete    LABORATORY  Lab Results   Component Value Date    SODIUM 138 07/07/2019    POTASSIUM 3.8 07/07/2019    CHLORIDE 102 07/07/2019    CO2 26 07/07/2019    GLUCOSE 187 (H) 07/07/2019    BUN 6 (L) 07/07/2019    CREATININE 0.90 07/07/2019        Lab Results    Component Value Date    WBC 21.6 (H) 07/07/2019    HEMOGLOBIN 14.2 07/07/2019    HEMATOCRIT 40.8 (L) 07/07/2019    PLATELETCT 356 07/07/2019        Total time of the discharge process exceeds 39 minutes.

## 2019-07-07 NOTE — CONSULTS
DATE OF SERVICE:  07/06/2019    OTOLARYNGOLOGY CONSULTATION    REQUESTING PHYSICIAN:  Vadim De Leon MD (ER)    REASON FOR CONSULTATION:  Possible peritonsillar abscess.    HISTORY OF PRESENT ILLNESS:  Patient is a 34-year-old male with a 3-day   history of increasing sore throat.  He has had difficulty with tonsil problems   all his life with enlarged tonsils being noted.  He has not had a prior   peritonsillar abscess.  He has significant pain with swallowing.  He feels   that he has difficulty clearing his secretions.  He does not have any   shortness of breath or obvious voice change.  CT scan showed what appeared to   be lucencies along the left tonsil area as well as along the retropharynx,   suggestive of a phlegmon versus early abscess.    PAST MEDICAL HISTORY:  The patient's past medical history is unremarkable for   major medical illness including diabetes.    ALLERGIES:  He has no known drug allergies.    MEDICATIONS:  He is on no medications on a regular basis.    PHYSICAL EXAMINATION:  GENERAL:  Shows the patient to be awake and alert, and not in acute distress.    No respiratory distress is noted.  No stridor is noted.  He does have a very   slightly muffled, hot potato voice.  HEENT:  He is able to control secretions.  No trismus is noted.  Face is   without swelling or redness.  No nasal discharge is noted.  Dentition appears   to be in relatively good repair.  Examination of the pharynx shows the uvula   to be edematous and reddened and deviating to the right.  There is some   swelling and fullness in the left peritonsillar area along with marked   enlargement of the left tonsil.  Large tonsillar concretions are present in   the tonsillar crypts.  Mild edema is present along the anterior tonsillar   pillar on the left side.  The right tonsil is also inflamed, but not enlarged   and without large tonsillar crypts.  There is an exudate present on the right   tonsil.  NECK:  Without marked fullness or  adenopathy.    IMAGING:  The patient's CT scan was reviewed.  The patient has 3 lucencies   present in the left tonsil area which were felt to be in the tonsil as oppose   to peritonsillar.  There is also a mild lucency present along the   retropharynx, early abscess or phlegmon.    Attempt at incision and drainage of the possible peritonsillar abscess or   tonsillar abscess was undertaken.  A separate report is completed.    IMPRESSION:  Tonsillar abscesses with severe acute tonsillitis with associated   tonsillar hypertrophy and history of recurrent tonsillitis.    RECOMMENDATION:  For IV antibiotic therapy for 1-2 days and then completion   with oral antibiotic on an outpatient basis.  Follow up in several weeks is   advised as an interval tonsillectomy was recommended to the patient.       ____________________________________     MD CLOVER CERVANTES / NTS    DD:  07/07/2019 01:40:03  DT:  07/07/2019 04:57:17    D#:  [id number]  Job#:  [id number]

## 2019-07-07 NOTE — ED NOTES
After starting IV, pt states that he feels faint, became diaphoretic and began vomiting. MD notified. Medications ordered and given per MAR. Wet cloth applied. Pt reports feeling better now.

## 2019-07-07 NOTE — FLOWSHEET NOTE
07/07/19 0300   Events/Summary/Plan   Events/Summary/Plan pt wears cpap with nasal pillows only: pt states friend will bring home unit if he stays beyond today-on cont monitor, BINA Mancia here and aware   Non-Invasive Resp Device Site Inspection Completed Intact   Chest Exam   Respiration 20   Pulse 89   Oximetry   #Pulse Oximetry (Single Determination) Yes   Continuous Oximetry Yes   Oxygen   Pulse Oximetry 98 %   O2 (LPM) 3   O2 Daily Delivery Respiratory  OxyMask

## 2019-07-07 NOTE — PROGRESS NOTES
"Admit profile completed. Pt had a \"dab pen\" and pocket knife with him. Items sent to security with [employer], security team. Pink bracelet is on the patient.    "

## 2019-07-07 NOTE — PROGRESS NOTES
Med rec updated and complete  Allergies reviewed  Pt was not sure the dose for all his medications.  Called [facility] [phone] to verify all prescription medications.  Pt reports no vitamins  Pt reports no antibiotics in the last 2 weeks.

## 2019-07-07 NOTE — DISCHARGE INSTRUCTIONS
Discharge Instructions    Discharged to home by car with friend. Discharged via wheelchair, hospital escort: Yes.  Special equipment needed: Not Applicable    Be sure to schedule a follow-up appointment with your primary care doctor or any specialists as instructed.     Discharge Plan:   Diet Plan: Discussed  Activity Level: Discussed  Smoking Cessation Offered: Patient Refused  Confirmed Follow up Appointment: Patient to Call and Schedule Appointment  Confirmed Symptoms Management: Discussed  Medication Reconciliation Updated: Yes  Pneumococcal Vaccine Administered/Refused: Not given - Patient refused pneumococcal vaccine  Influenza Vaccine Indication: Patient Refuses    I understand that a diet low in cholesterol, fat, and sodium is recommended for good health. Unless I have been given specific instructions below for another diet, I accept this instruction as my diet prescription.   Other diet: full liquid, advance as tolerated    Special Instructions: None    · Is patient discharged on Warfarin / Coumadin?   No       Peritonsillar Abscess  Introduction  A peritonsillar abscess is a collection of yellowish-white fluid (pus) in the back of the throat behind the tonsils. It usually occurs when an infection of the throat or tonsils (tonsillitis) spreads into the tissues around the tonsils.  What are the causes?  The infection that leads to a peritonsillar abscess is usually caused by streptococcal bacteria.  What are the signs or symptoms?  · Sore throat, often with pain on just one side.  · Swelling and tenderness of the glands (lymph nodes) in the neck.  · Difficulty swallowing.  · Difficulty opening your mouth.  · Fever.  · Chills.  · Drooling because of difficulty swallowing saliva.  · Headache.  · Changes in your voice.  · Bad breath.  How is this diagnosed?  Your health care provider will take your medical history and do a physical exam. Imaging tests may be done, such as an ultrasound or CT scan. A sample of  pus may be removed from the abscess using a needle (needle aspiration) or by swabbing the back of your throat. This sample will be sent to a lab for testing.  How is this treated?  Treatment usually involves draining the pus from the abscess. This may be done through needle aspiration or by making an incision in the abscess. You will also likely need to take antibiotic medicine.  Follow these instructions at home:  · Rest as much as possible and get plenty of sleep.  · Take medicines only as directed by your health care provider.  · If you were prescribed an antibiotic medicine, finish it all even if you start to feel better.  · If your abscess was drained by your health care provider, gargle with a mixture of salt and warm water:  ¨ Mix 1 tsp of salt in 8 oz of warm water.  ¨ Gargle with this mixture four times per day or as needed for comfort.  ¨ Do not swallow this mixture.  · Drink plenty of fluids.  · While your throat is sore, eat soft or liquid foods, such as frozen ice pops and ice cream.  · Keep all follow-up visits as directed by your health care provider. This is important.  Contact a health care provider if:  · You have increased pain, swelling, redness, or drainage in your throat.  · You develop a headache, a lack of energy (lethargy), or generalized feelings of illness.  · You have a fever.  · You feel dizzy.  · You have difficulty swallowing or eating.  · You show signs of becoming dehydrated, such as:  ¨ Light-headedness when standing.  ¨ Decreased urine output.  ¨ A fast heart rate.  ¨ Dry mouth.  Get help right away if:  · You have difficulty talking or breathing, or you find it easier to breathe when you lean forward.  · You are coughing up blood or vomiting blood.  · You have severe throat pain that is not helped by medicines.  · You start to drool.  This information is not intended to replace advice given to you by your health care provider. Make sure you discuss any questions you have with your  health care provider.  Document Released: 12/18/2006 Document Revised: 05/25/2017 Document Reviewed: 08/03/2015  © 2017 [employer]    Prednisolone tablets  What is this medicine?  PREDNISOLONE (pred NISS oh lone) is a corticosteroid. It is commonly used to treat inflammation of the skin, joints, lungs, and other organs. Common conditions treated include asthma, allergies, and arthritis. It is also used for other conditions, such as blood disorders and diseases of the adrenal glands.  This medicine may be used for other purposes; ask your health care provider or pharmacist if you have questions.  COMMON BRAND NAME(S): Millipred, Millipred DP, Millipred DP 12-Day, Millipred DP 6 Day, Prednoral  What should I tell my health care provider before I take this medicine?  They need to know if you have any of these conditions:  -Cushing's syndrome  -diabetes  -glaucoma  -heart problems or disease  -high blood pressure  -infection such as herpes, measles, tuberculosis, or chickenpox  -kidney disease  -liver disease  -mental problems  -myasthenia gravis  -osteoporosis  -seizures  -stomach ulcer or intestine disease including colitis and diverticulitis  -thyroid problem  -an unusual or allergic reaction to lactose, prednisolone, other medicines, foods, dyes, or preservatives  -pregnant or trying to get pregnant  -breast-feeding  How should I use this medicine?  Take this medicine by mouth with a glass of water. Follow the directions on the prescription label. Take it with food or milk to avoid stomach upset. If you are taking this medicine once a day, take it in the morning. Do not take more medicine than you are told to take. Do not suddenly stop taking your medicine because you may develop a severe reaction. Your doctor will tell you how much medicine to take. If your doctor wants you to stop the medicine, the dose may be slowly lowered over time to avoid any side effects.  Talk to your pediatrician regarding the use of this  medicine in children. Special care may be needed.  Overdosage: If you think you have taken too much of this medicine contact a poison control center or emergency room at once.  NOTE: This medicine is only for you. Do not share this medicine with others.  What if I miss a dose?  If you miss a dose, take it as soon as you can. If it is almost time for your next dose, take only that dose. Do not take double or extra doses.  What may interact with this medicine?  Do not take this medicine with any of the following medications:  -metyrapone  -mifepristone  This medicine may also interact with the following medications:  -aminoglutethimide  -amphotericin B  -aspirin and aspirin-like medicines  -barbiturates  -certain medicines for diabetes, like glipizide or glyburide  -cholestyramine  -cholinesterase inhibitors  -cyclosporine  -digoxin  -diuretics  -ephedrine  -female hormones, like estrogens and birth control pills  -isoniazid  -ketoconazole  -NSAIDS, medicines for pain and inflammation, like ibuprofen or naproxen  -phenytoin  -rifampin  -toxoids  -vaccines  -warfarin  This list may not describe all possible interactions. Give your health care provider a list of all the medicines, herbs, non-prescription drugs, or dietary supplements you use. Also tell them if you smoke, drink alcohol, or use illegal drugs. Some items may interact with your medicine.  What should I watch for while using this medicine?  Visit your doctor or health care professional for regular checks on your progress. If you are taking this medicine over a prolonged period, carry an identification card with your name and address, the type and dose of your medicine, and your doctor's name and address.  This medicine may increase your risk of getting an infection. Tell your doctor or health care professional if you are around anyone with measles or chickenpox, or if you develop sores or blisters that do not heal properly.  If you are going to have surgery,  tell your doctor or health care professional that you have taken this medicine within the last twelve months.  Ask your doctor or health care professional about your diet. You may need to lower the amount of salt you eat.  This medicine may affect blood sugar levels. If you have diabetes, check with your doctor or health care professional before you change your diet or the dose of your diabetic medicine.  What side effects may I notice from receiving this medicine?  Side effects that you should report to your doctor or health care professional as soon as possible:  -allergic reactions like skin rash, itching or hives, swelling of the face, lips, or tongue  -changes in emotions or moods  -changes in vision  -eye pain  -signs and symptoms of high blood sugar such as dizziness; dry mouth; dry skin; fruity breath; nausea; stomach pain; increased hunger or thirst; increased urination  -signs and symptoms of infection like fever or chills; cough; sore throat; pain or trouble passing urine  -slow growth in children (if used for longer periods of time)  -swelling of ankles, feet  -trouble sleeping  -unusually weak or tired  -weak bones (if used for longer periods of time)  Side effects that usually do not require medical attention (report to your doctor or health care professional if they continue or are bothersome):  -increased hunger  -nausea  -skin problems, acne, thin and shiny skin  -upset stomach  -weight gain  This list may not describe all possible side effects. Call your doctor for medical advice about side effects. You may report side effects to FDA at 5-683-FDA-3460.  Where should I keep my medicine?  Keep out of the reach of children.  Store at room temperature between 15 and 30 degrees C (59 and 86 degrees F). Keep container tightly closed. Throw away any unused medicine after the expiration date.  NOTE: This sheet is a summary. It may not cover all possible information. If you have questions about this  medicine, talk to your doctor, pharmacist, or health care provider.  © 2018 [employer] (2017-01-19 12:30:30)      Amoxicillin; Clavulanic Acid tablets  What is this medicine?  AMOXICILLIN; CLAVULANIC ACID (a mox i RONDA in; LISSETTE anna estephania ic AS id) is a penicillin antibiotic. It is used to treat certain kinds of bacterial infections. It will not work for colds, flu, or other viral infections.  This medicine may be used for other purposes; ask your health care provider or pharmacist if you have questions.  COMMON BRAND NAME(S): Augmentin  What should I tell my health care provider before I take this medicine?  They need to know if you have any of these conditions:  -bowel disease, like colitis  -kidney disease  -liver disease  -mononucleosis  -an unusual or allergic reaction to amoxicillin, penicillin, cephalosporin, other antibiotics, clavulanic acid, other medicines, foods, dyes, or preservatives  -pregnant or trying to get pregnant  -breast-feeding  How should I use this medicine?  Take this medicine by mouth with a full glass of water. Follow the directions on the prescription label. Take at the start of a meal. Do not crush or chew. If the tablet has a score line, you may cut it in half at the score line for easier swallowing. Take your medicine at regular intervals. Do not take your medicine more often than directed. Take all of your medicine as directed even if you think you are better. Do not skip doses or stop your medicine early.  Talk to your pediatrician regarding the use of this medicine in children. Special care may be needed.  Overdosage: If you think you have taken too much of this medicine contact a poison control center or emergency room at once.  NOTE: This medicine is only for you. Do not share this medicine with others.  What if I miss a dose?  If you miss a dose, take it as soon as you can. If it is almost time for your next dose, take only that dose. Do not take double or extra  doses.  What may interact with this medicine?  -allopurinol  -anticoagulants  -birth control pills  -methotrexate  -probenecid  This list may not describe all possible interactions. Give your health care provider a list of all the medicines, herbs, non-prescription drugs, or dietary supplements you use. Also tell them if you smoke, drink alcohol, or use illegal drugs. Some items may interact with your medicine.  What should I watch for while using this medicine?  Tell your doctor or health care professional if your symptoms do not improve.  Do not treat diarrhea with over the counter products. Contact your doctor if you have diarrhea that lasts more than 2 days or if it is severe and watery.  If you have diabetes, you may get a false-positive result for sugar in your urine. Check with your doctor or health care professional.  Birth control pills may not work properly while you are taking this medicine. Talk to your doctor about using an extra method of birth control.  What side effects may I notice from receiving this medicine?  Side effects that you should report to your doctor or health care professional as soon as possible:  -allergic reactions like skin rash, itching or hives, swelling of the face, lips, or tongue  -breathing problems  -dark urine  -fever or chills, sore throat  -redness, blistering, peeling or loosening of the skin, including inside the mouth  -seizures  -trouble passing urine or change in the amount of urine  -unusual bleeding, bruising  -unusually weak or tired  -white patches or sores in the mouth or throat  Side effects that usually do not require medical attention (report to your doctor or health care professional if they continue or are bothersome):  -diarrhea  -dizziness  -headache  -nausea, vomiting  -stomach upset  -vaginal or anal irritation  This list may not describe all possible side effects. Call your doctor for medical advice about side effects. You may report side effects to FDA  at 3-690-FDA-3968.  Where should I keep my medicine?  Keep out of the reach of children.  Store at room temperature below 25 degrees C (77 degrees F). Keep container tightly closed. Throw away any unused medicine after the expiration date.  NOTE: This sheet is a summary. It may not cover all possible information. If you have questions about this medicine, talk to your doctor, pharmacist, or health care provider.  © 2018 [employer] Standard (2009-03-12 12:04:30)      Full Liquid Diet  A full liquid diet may be used:  · To help you transition from a clear liquid diet to a soft diet.  · When your body is healing and can only tolerate foods that are easy to digest.  · Before or after certain a procedure, test, or surgery (such as stomach or intestinal surgeries).  · If you have trouble swallowing or chewing.  A full liquid diet includes fluids and foods that are liquid or will become liquid at room temperature. The full liquid diet gives you the proteins, fluids, salts, and minerals that you need for energy.  If you continue this diet for more than 72 hours, talk to your health care provider about how many calories you need to consume. If you continue the diet for more than 5 days, talk to your health care provider about taking a multivitamin or a nutritional supplement.  What do I need to know about a full liquid diet?  · You may have any liquid.  · You may have any food that becomes a liquid at room temperature. The food is considered a liquid if it can be poured off a spoon at room temperature.  · Drink one serving of citrus or vitamin C-enriched fruit juice daily.  What foods can I eat?  Grains   Any grain food that can be pureed in soup (such as crackers, pasta, and rice). Hot cereal (such as farina or oatmeal) that has been blended. Talk to your health care provider or dietitian about these foods.  Vegetables   Pulp-free tomato or vegetable juice. Vegetables pureed in soup.  Fruits   Fruit juice, including  nectars and juices with pulp.  Meats and Other Protein Sources   Eggs in custard, eggnog mix, and eggs used in ice cream or pudding. Strained meats, like in baby food, may be allowed. Consult your health care provider.  Dairy   Milk and milk-based beverages, including milk shakes and instant breakfast mixes. Smooth yogurt. Pureed cottage cheese. Avoid these foods if they are not well tolerated.  Beverages   All beverages, including liquid nutritional supplements. Ask your health care provider if you can have carbonated beverages. They may not be well tolerated.  Condiments   Iodized salt, pepper, spices, and flavorings. Cocoa powder. Vinegar, ketchup, yellow mustard, smooth sauces (such as hollandaise, cheese sauce, or white sauce), and soy sauce.  Sweets and Desserts   Custard, smooth pudding. Flavored gelatin. Tapioca, junket. Plain ice cream, sherbet, fruit ices. Frozen ice pops, frozen fudge pops, pudding pops, and other frozen bars with cream. Syrups, including chocolate syrup. Sugar, honey, jelly.  Fats and Oils   Margarine, butter, cream, sour cream, and oils.  Other   Broth and cream soups. Strained, broth-based soups.  The items listed above may not be a complete list of recommended foods or beverages. Contact your dietitian for more options.   What foods can I not eat?  Grains   All breads. Grains are not allowed unless they are pureed into soup.  Vegetables   Vegetables are not allowed unless they are juiced, or cooked and pureed into soup.  Fruits   Fruits are not allowed unless they are juiced.  Meats and Other Protein Sources   Any meat or fish. Cooked or raw eggs. Nut butters.  Dairy   Cheese.  Condiments   Stone ground mustards.  Fats and Oils   Fats that are coarse or chunky.  Sweets and Desserts   Ice cream or other frozen desserts that have any solids in them or on top, such as nuts, chocolate chips, and pieces of cookies. Cakes. Cookies. Candy.  Others   Soups with chunks or pieces in them.  The  items listed above may not be a complete list of foods and beverages to avoid. Contact your dietitian for more information.   This information is not intended to replace advice given to you by your health care provider. Make sure you discuss any questions you have with your health care provider.  Document Released: 12/18/2006 Document Revised: 05/25/2017 Document Reviewed: 10/23/2014  [employer] Interactive Patient Education © 2017 [employer]      Depression / Suicide Risk    As you are discharged from this [facility] facility, it is important to learn how to keep safe from harming yourself.    Recognize the warning signs:  · Abrupt changes in personality, positive or negative- including increase in energy   · Giving away possessions  · Change in eating patterns- significant weight changes-  positive or negative  · Change in sleeping patterns- unable to sleep or sleeping all the time   · Unwillingness or inability to communicate  · Depression  · Unusual sadness, discouragement and loneliness  · Talk of wanting to die  · Neglect of personal appearance   · Rebelliousness- reckless behavior  · Withdrawal from people/activities they love  · Confusion- inability to concentrate     If you or a loved one observes any of these behaviors or has concerns about self-harm, here's what you can do:  · Talk about it- your feelings and reasons for harming yourself  · Remove any means that you might use to hurt yourself (examples: pills, rope, extension cords, firearm)  · Get professional help from the community (Mental Health, Substance Abuse, psychological counseling)  · Do not be alone:Call your Safe Contact- someone whom you trust who will be there for you.  · Call your local CRISIS HOTLINE 882-4912 or 848-725-4261  · Call your local Children's Mobile Crisis Response Team [place] (955) 565-6262 or www.[employer]  · Call the toll free National Suicide Prevention Hotlines   · National Suicide Prevention Lifeline  525-735-NIAV (5276)  · National [place] Line Network 800-SUICIDE (680-4977)

## 2019-07-09 LAB
S PYO SPEC QL CULT: NORMAL
SIGNIFICANT IND 70042: NORMAL
SITE SITE: NORMAL
SOURCE SOURCE: NORMAL

## 2020-05-08 ENCOUNTER — TELEMEDICINE (OUTPATIENT)
Dept: BEHAVIORAL HEALTH | Facility: CLINIC | Age: 35
End: 2020-05-08
Payer: COMMERCIAL

## 2020-05-08 VITALS — HEIGHT: 68 IN | BODY MASS INDEX: 32.58 KG/M2 | WEIGHT: 215 LBS

## 2020-05-08 DIAGNOSIS — F41.1 GENERALIZED ANXIETY DISORDER: ICD-10-CM

## 2020-05-08 DIAGNOSIS — F12.10 CANNABIS ABUSE: ICD-10-CM

## 2020-05-08 DIAGNOSIS — F33.2 SEVERE EPISODE OF RECURRENT MAJOR DEPRESSIVE DISORDER, WITHOUT PSYCHOTIC FEATURES (HCC): Primary | ICD-10-CM

## 2020-05-08 DIAGNOSIS — F10.10 ALCOHOL ABUSE: ICD-10-CM

## 2020-05-08 PROCEDURE — 99215 OFFICE O/P EST HI 40 MIN: CPT | Mod: 95,CR | Performed by: PSYCHIATRY & NEUROLOGY

## 2020-05-08 PROCEDURE — 96127 BRIEF EMOTIONAL/BEHAV ASSMT: CPT | Mod: 95,CR | Performed by: PSYCHIATRY & NEUROLOGY

## 2020-05-08 RX ORDER — LAMOTRIGINE 200 MG/1
200 TABLET, EXTENDED RELEASE ORAL DAILY
Qty: 30 TAB | Refills: 0 | Status: SHIPPED | OUTPATIENT
Start: 2020-05-08 | End: 2020-05-22

## 2020-05-08 RX ORDER — LITHIUM CARBONATE 450 MG
300 TABLET, EXTENDED RELEASE ORAL
Qty: 30 TAB | Refills: 0 | Status: SHIPPED | OUTPATIENT
Start: 2020-05-08 | End: 2020-05-22

## 2020-05-08 RX ORDER — RISPERIDONE 1 MG/1
TABLET ORAL
COMMUNITY
Start: 2020-04-29 | End: 2020-05-08

## 2020-05-08 RX ORDER — LAMOTRIGINE 200 MG/1
TABLET, EXTENDED RELEASE ORAL
COMMUNITY
Start: 2019-12-03 | End: 2020-05-08 | Stop reason: SDUPTHER

## 2020-05-08 RX ORDER — SERTRALINE HYDROCHLORIDE 25 MG/1
TABLET, FILM COATED ORAL
COMMUNITY
Start: 2020-05-05 | End: 2020-05-08

## 2020-05-08 RX ORDER — CLONAZEPAM 0.5 MG/1
TABLET, ORALLY DISINTEGRATING ORAL
COMMUNITY
Start: 2020-04-28 | End: 2020-05-08

## 2020-05-08 RX ORDER — PRAZOSIN HYDROCHLORIDE 1 MG/1
CAPSULE ORAL
COMMUNITY
Start: 2020-05-03 | End: 2020-05-22

## 2020-05-08 ASSESSMENT — ANXIETY QUESTIONNAIRES
5. BEING SO RESTLESS THAT IT IS HARD TO SIT STILL: NOT AT ALL
4. TROUBLE RELAXING: SEVERAL DAYS
3. WORRYING TOO MUCH ABOUT DIFFERENT THINGS: SEVERAL DAYS
GAD7 TOTAL SCORE: 7
1. FEELING NERVOUS, ANXIOUS, OR ON EDGE: SEVERAL DAYS
7. FEELING AFRAID AS IF SOMETHING AWFUL MIGHT HAPPEN: SEVERAL DAYS
6. BECOMING EASILY ANNOYED OR IRRITABLE: SEVERAL DAYS
2. NOT BEING ABLE TO STOP OR CONTROL WORRYING: MORE THAN HALF THE DAYS

## 2020-05-08 ASSESSMENT — PATIENT HEALTH QUESTIONNAIRE - PHQ9
5. POOR APPETITE OR OVEREATING: 3 - NEARLY EVERY DAY
CLINICAL INTERPRETATION OF PHQ2 SCORE: 6
SUM OF ALL RESPONSES TO PHQ QUESTIONS 1-9: 22

## 2020-05-21 NOTE — PROGRESS NOTES
"This encounter was conducted via Zoom .   Verbal consent was obtained. Patient's identity was verified.    PSYCHIATRY FOLLOW-UP NOTE      Name: Kendall Eckert  MRN: [id number]  : 1985  Age: 34 y.o.  Date of assessment: 2020  PCP: Pcp Pt States None  Persons in attendance: Patient  Total face-to-face time: 25 minutes    REASON FOR VISIT/CHIEF COMPLAINT (as stated by Patient):  Kendall Eckert is a 34 y.o., White male, attending follow-up appointment for depression and anxiety      HISTORY OF PRESENT ILLNESS:  Kendall Eckert is a 34 y.o. old male with MDD & PAO comes in today for follow up. Patient was last seen on 20 , and following treatment planning recommendations were done:  · Increase sertraline to 50 mg daily for depression and anxiety management.  Given 1 month supply with no refill.  · Continue Lamictal  mg daily for depression augmentation.  · Discontinue risperidone and add Lithobid 450 mg daily after dinner for depression augmentation and mood stabilization and to help with passive suicidal thoughts.  Given 30-day supply with no refill.  · Lab work ordered for lithium level, CBC, CMP and TSH.  · Continue prazosin and in next session we will consider discontinuation option.  · Motivated to consider psychotherapy or partial hospitalization program but patient is currently not interested as he is in [place].    Patient reports depression with persistence of little interest in doing things, low energy with poor concentration and feelings of guilt with passive death wishes. Initial part of the session was dedicated to the safety assessment and patient reports not having any specific thoughts and reports having chronic passive death wishes of \" if something bad happens to me that will be okay\" but denies any intent to harm himself.  Patient expressed marked interest in getting better and describes his parents as main support.  Patient is currently in [place] taking care of his parents and " the current COVID-19 situation.  Patient was educated on importance of calling 911.    In last session information was also sent to his my chart on how to deal with suicidal thoughts.    Patient noticed mild improvement in depression in the last 2 weeks his PHQ 9 score has improved from 22 four weeks ago to 15 today.  Patient agreed with plan of increasing lithium to 600 mg daily but understand the importance of getting lithium level.  Patient is currently in [place] and is not close to [facility].  Patient agreed with plan of getting lithium level in future when he returns to [place].  Patient was educated on symptoms and signs of lithium toxicity and patient reports understanding.  He did reviewed the lithium information sent to him last time and he stopped drinking alcohol after reading the lithium information.  Patient does report having anxiety on a daily basis and reports taking Klonopin 1 mg daily as prescribed by his primary care physicians.  Patient is denying deepa or psychotic symptoms but agreed with plan of increasing Zoloft to 100 mg daily for depression and anxiety management.    Later part of the session was dedicated to discussion of treatment option for treatment resistant depression including ketamine, modafinil and other treatment.  Patient is planning to look up intranasal ketamine provider in [place] and [place].      RESPONSE TO TREATMENT:  No improvement      MEDICATION SIDE EFFECTS:  none      PSYCHOTHERAPY ASPECT OF SESSION (16 MIN):  · Most part of the session was dedicated to safety assessment as discussed above.  · Extensive motivational interviewing scale implemented and patient given psychoeducation on importance of medication compliance, physical activity and not drinking alcohol.  · Sleep hygiene instructions were provided again and patient agreed to follow this on a daily basis.  · Importance of implementing at least 20 minutes of mild walking on a daily basis was encouraged and  "patient appears motivated to follow through.  · Discussed the importance of getting sunlight for at least 20 minutes during midday for improvement in mood and anxiety symptoms.  · Patient also educated on importance of considering psychotherapy and discussed the option of intensive outpatient program but patient is currently in [place] taking care of his parents.  Patient agreed to consider this option after his return to [place] in future.      CURRENT MEDICATIONS:  Current Outpatient Medications   Medication Sig Dispense Refill   • prazosin (MINIPRESS) 1 MG Cap TAKE 1 CAPSULE BY MOUTH EVERYDAY AT BEDTIME     • LamoTRIgine (LAMICTAL XR) 200 MG TABLET SR 24 HR Take 200 mg by mouth every day. 30 Tab 0   • sertraline (ZOLOFT) 50 MG Tab Take 1 Tab by mouth every day. 30 Tab 0   • lithium CR (ESKALITH CR) 450 MG Tab CR Take 1 Tab by mouth ONE-HALF HOUR AFTER DINNER. 30 Tab 0   • atorvastatin (LIPITOR) 40 MG Tab Take 40 mg by mouth every day.       No current facility-administered medications for this visit.        MEDICAL HISTORY  Past Medical History:   Diagnosis Date   • Depression    • Hyperlipidemia      No past surgical history on file.    PAST PSYCHIATRIC HISTORY  Prior psychiatric hospitalization: March 2020 ([facility])  Prior Self harm/suicide attempt: no  Prior Diagnosis: bipolar 2 disorder, anxiety     PAST PSYCHIATRIC MEDICATIONS  · Fluoxetine  · Wellbutrin-made him more anxious  · Seroquel- not helpful  · Abilify-akathisia  · Seroquel-akathisia  · Risperidone  · Xanax  · Klonopin  · Denies any trial of ECT or TMS     FAMILY HISTORY  Psychiatric diagnosis: Mother with history of depression  History of suicide attempts:  no  Substance abuse history:  no     SUBSTANCE USE HISTORY:  ALCOHOL: few beers every other day  TOBACCO: no  CANNABIS: daily \"less amount\" per patient: has reduced the amount from past  OPIOIDS: no  PRESCRIPTION MEDICATIONS: no  OTHERS: no  History of inpatient/outpatient rehab " "treatment: none     SOCIAL HISTORY  Childhood: born in [place] and describes childhood as ok  Education: Masters in business management  in Special Education: no  Intellectual Disability: no  Employment: Was working in [place] but not working due to COVID-19 (laid off)  Relationship: single  Kids: no  Current living situation: lives with parents now  Current/past legal issues: no  History of emotional/physical/sexual abuse - no      REVIEW OF SYSTEMS:        Constitutional negative   Eyes negative   Ears/Nose/Mouth/Throat negative   Cardiovascular negative   Respiratory negative   Gastrointestinal negative   Genitourinary negative   Muscular negative   Integumentary negative   Neurological negative   Endocrine negative   Hematologic/Lymphatic negative     PHYSICAL EXAMINAION:  Vital signs: Ht 1.727 m (5' 8\")   Wt 97.5 kg (215 lb)   BMI 32.69 kg/m²   Musculoskeletal: Normal gait.   Abnormal movements: none      MENTAL STATUS EXAMINATION      General:   - Grooming and hygiene: Casual,   - Apparent distress: none,   - Behavior: Tense  - Eye Contact:  Good,   - no psychomotor agitation or retardation    - Participation: Active verbal participation  Orientation: Alert and Fully Oriented to person, place and time  Mood: Depressed  Affect: Constricted,  Thought Process: Logical and Goal-directed  Thought Content: Denies suicidal or homicidal ideations, intent or plan Within normal limits  Perception: Denies auditory or visual hallucinations. No delusions noted Within normal limits  Attention span and concentration: Intact   Speech:Rate within normal limits and Volume within normal limits  Language: Appropriate   Insight: Good  Judgment: Good  Recent and remote memory: No gross evidence of memory deficits        DEPRESSION SCREENING:  Depression Screen (PHQ-2/PHQ-9) 7/7/2019 5/8/2020 5/22/2020   PHQ-2 Total Score 2 - -   PHQ-2 Total Score - 6 5   PHQ-9 Total Score 7 - -   PHQ-9 Total Score - 22 15 "       Interpretation of PHQ-9 Total Score   Score Severity   1-4 No Depression   5-9 Mild Depression   10-14 Moderate Depression   15-19 Moderately Severe Depression   20-27 Severe Depression    CURRENT RISK:       Suicidal: Low       Homicidal: Low       Self-Harm: Low       Relapse: Low       Crisis Safety Plan Reviewed Yes       If evidence of imminent risk is present, intervention/plan:  · Calling 911 if suicidal ideations gets worse.  · Discussed the option of IOP at [facility]: but patient is in [place] (not sure when he will return)  · Discussed the option of going to nearest ER and getting admitted if suicidal ideations get worse.   · Reviewed the information sent on [employer] regarding how to take care of passive death wishes.   · Patient denies active suicidal ideations or intent of acting.  Patient describes parents as positive support and protective factor.  Motivated to get better at this time.      MEDICAL RECORDS/LABS/DIAGNOSTIC TESTS REVIEWED:  No new lab since last visit     [facility] records -   Reviewed   [id number]   No data.       DIAGNOSTIC IMPRESSION(S):  · Major depressive disorder, recurrent, severe without psychotic features rule out Bipolar 2 disorder.  · Generalized anxiety disorder  · Cannabis abuse  · Alcohol abuse        PLAN:  (1) Major depressive disorder, recurrent, severe without psychotic features rule out Bipolar 2 disorder  · Slow improvement with PHQ9 of 15 (compared to 22 four weeks ago)  · Increase sertraline to 100 mg daily for depression and anxiety management.  Given 1 month supply with no refill.  · Discontinue Lamictal  mg daily as patient has already done this 5 days ago.  · Increase Lithium 600 mg daily after dinner for depression augmentation and mood stabilization and to help with passive suicidal thoughts.  Given 20-day supply with no refill.  · Lab work ordered for lithium level, CBC, CMP and TSH.  · Discontinue prazosin: as patient not meeting criteria for PTSD and  nightmares were likely from risperidone (which was discontinued in last session).  · Motivated to consider psychotherapy or partial hospitalization program but patient is currently not interested as he is in [place].  · Medication options, alternatives (including no medications) and medication risks/benefits/side effects were discussed in detail.  · Explained importance of contraceptive measures while on psychotropic medications, educated to let provider know if ever pregnant or wanting to become pregnant. Verbalized understanding.  · The patient was advised to call, message provider on MyChart, or come in to the clinic if symptoms worsen or if any future questions/issues regarding their medications arise; the patient verbalized understanding and agreement.    · The patient was educated to call 911, call the suicide hotline, or go to local ER if having thoughts of suicide or homicide; verbalized understanding.     (2) Generalized Anxiety disorder  · No improvement  · Increase sertraline to 100 mg daily for depression and anxiety management.  Given 1 month supply with no refill.  · Continue klonopin 1 mg daily PRN: patient is prescribed this by his PCP: just refilled 1 month supply yesterday.  · Motivated to consider psychotherapy or partial hospitalization program but patient is currently not interested as he is in [place].     (3) alcohol abuse and cannabis abuse:  · Stopped drinking last week: motivated to remain sober.        Return to clinic in 2 weeks or sooner if symptoms worsen.  Next Appointment: instruction provided on how to make the next appointment.     The proposed treatment plan was discussed with the patient who was provided the opportunity to ask questions and make suggestions regarding alternative treatment. Patient verbalized understanding and expressed agreement with the plan.       Choco Weathers M.D.  05/22/20    This note was created using voice recognition software (Dragon). The  accuracy of the dictation is limited by the abilities of the software. I have reviewed the note prior to signing, however some errors in grammar and context are still possible. If you have any questions related to this note please do not hesitate to contact our office.

## 2020-05-22 ENCOUNTER — TELEMEDICINE (OUTPATIENT)
Dept: BEHAVIORAL HEALTH | Facility: CLINIC | Age: 35
End: 2020-05-22
Payer: COMMERCIAL

## 2020-05-22 VITALS — WEIGHT: 215 LBS | HEIGHT: 68 IN | BODY MASS INDEX: 32.58 KG/M2

## 2020-05-22 DIAGNOSIS — F33.2 SEVERE EPISODE OF RECURRENT MAJOR DEPRESSIVE DISORDER, WITHOUT PSYCHOTIC FEATURES (HCC): Primary | ICD-10-CM

## 2020-05-22 DIAGNOSIS — F41.1 GENERALIZED ANXIETY DISORDER: ICD-10-CM

## 2020-05-22 DIAGNOSIS — F10.10 ALCOHOL ABUSE: ICD-10-CM

## 2020-05-22 PROCEDURE — 90833 PSYTX W PT W E/M 30 MIN: CPT | Mod: 95,CR | Performed by: PSYCHIATRY & NEUROLOGY

## 2020-05-22 PROCEDURE — 99215 OFFICE O/P EST HI 40 MIN: CPT | Mod: 95,CR | Performed by: PSYCHIATRY & NEUROLOGY

## 2020-05-22 RX ORDER — SERTRALINE HYDROCHLORIDE 100 MG/1
100 TABLET, FILM COATED ORAL DAILY
Qty: 30 TAB | Refills: 0 | Status: SHIPPED | OUTPATIENT
Start: 2020-05-22 | End: 2020-06-08 | Stop reason: SDUPTHER

## 2020-05-22 RX ORDER — LITHIUM CARBONATE 600 MG/1
600 CAPSULE ORAL DAILY
Qty: 20 CAP | Refills: 0 | Status: SHIPPED | OUTPATIENT
Start: 2020-05-22 | End: 2020-06-08 | Stop reason: SDUPTHER

## 2020-05-22 ASSESSMENT — PATIENT HEALTH QUESTIONNAIRE - PHQ9
CLINICAL INTERPRETATION OF PHQ2 SCORE: 5
SUM OF ALL RESPONSES TO PHQ QUESTIONS 1-9: 15
5. POOR APPETITE OR OVEREATING: 0 - NOT AT ALL

## 2020-06-08 ENCOUNTER — TELEMEDICINE (OUTPATIENT)
Dept: BEHAVIORAL HEALTH | Facility: CLINIC | Age: 35
End: 2020-06-08
Payer: COMMERCIAL

## 2020-06-08 VITALS — BODY MASS INDEX: 32.58 KG/M2 | WEIGHT: 215 LBS | HEIGHT: 68 IN

## 2020-06-08 DIAGNOSIS — F33.2 SEVERE EPISODE OF RECURRENT MAJOR DEPRESSIVE DISORDER, WITHOUT PSYCHOTIC FEATURES (HCC): Primary | ICD-10-CM

## 2020-06-08 DIAGNOSIS — F41.1 GENERALIZED ANXIETY DISORDER: ICD-10-CM

## 2020-06-08 PROCEDURE — 99214 OFFICE O/P EST MOD 30 MIN: CPT | Performed by: PSYCHIATRY & NEUROLOGY

## 2020-06-08 PROCEDURE — 90833 PSYTX W PT W E/M 30 MIN: CPT | Performed by: PSYCHIATRY & NEUROLOGY

## 2020-06-08 RX ORDER — LITHIUM CARBONATE 300 MG
900 TABLET ORAL 3 TIMES DAILY
Qty: 135 TAB | Refills: 0 | Status: SHIPPED | OUTPATIENT
Start: 2020-06-08 | End: 2020-06-17

## 2020-06-08 RX ORDER — CLONAZEPAM 1 MG/1
TABLET, ORALLY DISINTEGRATING ORAL
COMMUNITY
Start: 2020-05-21 | End: 2020-06-22 | Stop reason: SDUPTHER

## 2020-06-08 RX ORDER — SERTRALINE HYDROCHLORIDE 100 MG/1
150 TABLET, FILM COATED ORAL DAILY
Qty: 45 TAB | Refills: 0 | Status: SHIPPED | OUTPATIENT
Start: 2020-06-08 | End: 2020-06-22

## 2020-06-08 RX ORDER — LITHIUM CARBONATE 600 MG/1
600 CAPSULE ORAL DAILY
Qty: 30 CAP | Refills: 0 | Status: SHIPPED | OUTPATIENT
Start: 2020-06-08 | End: 2020-06-08

## 2020-06-08 NOTE — PROGRESS NOTES
This encounter was conducted via Zoom .   Verbal consent was obtained. Patient's identity was verified.    PSYCHIATRY FOLLOW-UP NOTE      Name: Kendall Eckert  MRN: [id number]  : 1985  Age: 35 y.o.  Date of assessment: 2020  PCP: Pcp Pt States None  Persons in attendance: Patient  Total face-to-face time: 25 minutes    REASON FOR VISIT/CHIEF COMPLAINT (as stated by Patient):  Kendall Eckert is a 35 y.o., White male, attending follow-up appointment for mood and anxiety management.       HISTORY OF PRESENT ILLNESS:  Kendall Eckert is a 35 y.o. old male with MDD & PAO comes in today for follow up. Patient was last seen on 20 , and following treatment planning recommendations were done:  · Increase sertraline to 100 mg daily for depression and anxiety management.  Given 1 month supply with no refill.  · Discontinue Lamictal  mg daily as patient has already done this 5 days ago.  · Increase Lithium 600 mg daily after dinner for depression augmentation and mood stabilization and to help with passive suicidal thoughts.  Given 20-day supply with no refill.  · Continue klonopin 1 mg daily PRN: patient is prescribed this by his PCP: just refilled 1 month supply yesterday.  · Lab work ordered for lithium level, CBC, CMP and TSH.  · Discontinue prazosin: as patient not meeting criteria for PTSD and nightmares were likely from risperidone (which was discontinued in last session).  · Motivated to consider psychotherapy or partial hospitalization program but patient is currently not interested as he is in [place].    Patient is compliant with medication with no side effects other than mild constipation which is not bothersome for patient.  Patient noticed mild improvement in energy and motivation but reports persistence of low energy and depression with anxiety on a daily basis.  Patient to have improved affect noted on the evaluation but patient reports not feeling any improvement from inside.  We discussed  how it takes long time for a person with depression to notice improvement compared to people around him.  Patient is still in [place] also he is not able to engage in PHP program at [facility] at this time.  He is staying in [place] to take care of of his parents.  On further exploration patient reports recent increase in physical activity in terms of him playing cards with his parents and doing at least 2 times daily short walks of 20 minutes in duration.  Patient was given positive encouragement for this behavior and following the recommendations given in last session.    Patient has done the lithium level at [place] and will fax or send the results of lab by my chart when available.  He is denying any symptoms consistent with lithium toxicity, including shakes, blurry vision, nausea, vomiting or any new physical symptoms.  Patient was again educated on narrow therapeutic level for lithium and discussed the important drug interactions that can cause lithium toxicity.  Patient is mindful and reports that he will be starting ketamine strips 50 mg at nighttime.  This is prescribed by Dr. Wagoner in [facility].  Patient was educated to monitor closely for any new physical symptoms including activation of anxiety symptoms with this approach and dissociation side effect of ketamine.  Patient will be following with Dr. Nelson for this ketamine sublingual strip prescription.      Addendum 6/8/2028 at 1:25 PM: Patient message me that his lithium level is 0.12.  Agreed with plan of increasing lithium dose from 600 daily after dinner to 900 mg daily after dinner.    RESPONSE TO TREATMENT:  Slow improvement      MEDICATION SIDE EFFECTS:  none      PSYCHOTHERAPY ASPECT OF SESSION (16 MIN):  · Most part of the session was dedicated to motivational interviewing and motivating patient to continue improving his physical activity.  We discussed the importance of behavioral activation in addition to medication  management.  · Discussed the importance of psychotherapy and also discussed the option of PHP but patient is currently in [place] taking care of his parents.  Patient did agree with plan of considering this option once he returns back to [place].  · Importance of physical activity on a daily basis and good diet was emphasized.  · Positive encouragement provided for patient to continue engaging in physical activity and interaction with people close to him including his parents.  · Sleep hygiene was again discussed and importance of deep breathing and relaxation techniques were emphasized.  · In the end psychoeducation was provided regarding timeline for medications onset of response and also education provided on how to person with depression feels or sees these improvement late compared to people around him.      CURRENT MEDICATIONS:  Current Outpatient Medications   Medication Sig Dispense Refill   • lithium 600 MG capsule Take 1 Cap by mouth every day. 20 Cap 0   • sertraline (ZOLOFT) 100 MG Tab Take 1 Tab by mouth every day. 30 Tab 0   • atorvastatin (LIPITOR) 40 MG Tab Take 40 mg by mouth every day.       No current facility-administered medications for this visit.        MEDICAL HISTORY  Past Medical History:   Diagnosis Date   • Depression    • Hyperlipidemia      No past surgical history on file.    PAST PSYCHIATRIC HISTORY  Prior psychiatric hospitalization: March 2020 ([facility])  Prior Self harm/suicide attempt: no  Prior Diagnosis: bipolar 2 disorder, anxiety     PAST PSYCHIATRIC MEDICATIONS  · Fluoxetine  · Wellbutrin-made him more anxious  · Seroquel- not helpful  · Abilify-akathisia  · Seroquel-akathisia  · Risperidone  · Xanax  · Klonopin  · Denies any trial of ECT or TMS     FAMILY HISTORY  Psychiatric diagnosis: Mother with history of depression  History of suicide attempts:  no  Substance abuse history:  no     SUBSTANCE USE HISTORY:  ALCOHOL: few beers every other  "day  TOBACCO: no  CANNABIS: daily \"less amount\" per patient: has reduced the amount from past  OPIOIDS: no  PRESCRIPTION MEDICATIONS: no  OTHERS: no  History of inpatient/outpatient rehab treatment: none     SOCIAL HISTORY  Childhood: born in [place] and describes childhood as ok  Education: Masters in business management  in Special Education: no  Intellectual Disability: no  Employment: Was working in [place] but not working due to COVID-19 (laid off)  Relationship: single  Kids: no  Current living situation: lives with parents now  Current/past legal issues: no  History of emotional/physical/sexual abuse - no      REVIEW OF SYSTEMS:        Constitutional negative   Eyes negative   Ears/Nose/Mouth/Throat negative   Cardiovascular negative   Respiratory negative   Gastrointestinal negative   Genitourinary negative   Muscular negative   Integumentary negative   Neurological negative   Endocrine negative   Hematologic/Lymphatic negative     PHYSICAL EXAMINAION:  Vital signs: Ht 1.727 m (5' 8\") Comment: pt stated  Wt 97.5 kg (215 lb) Comment: pt stated  BMI 32.69 kg/m²   Musculoskeletal: Normal gait.   Abnormal movements: none      MENTAL STATUS EXAMINATION      General:   - Grooming and hygiene: Casual,   - Apparent distress: none,   - Behavior: Tense  - Eye Contact:  Good,   - no psychomotor agitation or retardation    - Participation: Active verbal participation  Orientation: Alert and Fully Oriented to person, place and time  Mood: Depressed and Anxious  Affect: Constricted,  Thought Process: Logical and Goal-directed  Thought Content: Denies suicidal or homicidal ideations, intent or plan Within normal limits  Perception: Denies auditory or visual hallucinations. No delusions noted Within normal limits  Attention span and concentration: Intact   Speech:Rate within normal limits  Language: Appropriate   Insight: Good  Judgment: Good  Recent and remote memory: No gross evidence of memory " deficits        DEPRESSION SCREENING:  Depression Screen (PHQ-2/PHQ-9) 7/7/2019 5/8/2020 5/22/2020   PHQ-2 Total Score 2 - -   PHQ-2 Total Score - 6 5   PHQ-9 Total Score 7 - -   PHQ-9 Total Score - 22 15       Interpretation of PHQ-9 Total Score   Score Severity   1-4 No Depression   5-9 Mild Depression   10-14 Moderate Depression   15-19 Moderately Severe Depression   20-27 Severe Depression    CURRENT RISK:       Suicidal: Low       Homicidal: Low       Self-Harm: Low       Relapse: Low       Crisis Safety Plan Reviewed Not Indicated      MEDICAL RECORDS/LABS/DIAGNOSTIC TESTS REVIEWED:  No new lab since last visit     [facility] records -   [id number]   No data.     DIAGNOSTIC IMPRESSION(S):  · Major depressive disorder, recurrent, severe without psychotic features rule out Bipolar 2 disorder.  · Generalized anxiety disorder  · Cannabis abuse  · Alcohol abuse        PLAN:  (1) Major depressive disorder, recurrent, severe without psychotic features rule out Bipolar 2 disorder  · No improvement noted per patient  · Increase sertraline to 150 mg daily for depression and anxiety management.  Given 1 month supply with no refill.  · Lithium level: 0.12: Increase lithium dose from 600 daily after dinner to 900 mg daily after dinner. Given 15 days supply with no refill.   · Lab work ordered for lithium level, CBC, CMP and TSH: patient did the blood work in [place] & [facility] (when available).  · Motivated to consider psychotherapy or partial hospitalization program but patient is currently not interested as he is in [place].  · Medication options, alternatives (including no medications) and medication risks/benefits/side effects were discussed in detail.  · Explained importance of contraceptive measures while on psychotropic medications, educated to let provider know if ever pregnant or wanting to become pregnant. Verbalized understanding.  · The patient was advised to call, message provider  on MyChart, or come in to the clinic if symptoms worsen or if any future questions/issues regarding their medications arise; the patient verbalized understanding and agreement.    · The patient was educated to call 911, call the suicide hotline, or go to local ER if having thoughts of suicide or homicide; verbalized understanding.     (2) Generalized Anxiety disorder  · No improvement  · Increase sertraline to 150 mg daily for depression and anxiety management.  Given 1 month supply with no refill.  · Continue klonopin 1 mg daily PRN: patient is prescribed this by his PCP: just refilled 1 month supply 2 weeks ago.  No prescription given today as patient has supply at home.  · Motivated to consider psychotherapy or partial hospitalization program but patient is currently not interested as he is in [place].     (3) alcohol abuse and cannabis abuse:  · Stopped drinking 3 weeks ago: motivated to remain sober.      Return to clinic in 2 weeks or sooner if symptoms worsen.  Next Appointment: instruction provided on how to make the next appointment.     The proposed treatment plan was discussed with the patient who was provided the opportunity to ask questions and make suggestions regarding alternative treatment. Patient verbalized understanding and expressed agreement with the plan.       Choco Weathers M.D.  06/08/20    This note was created using voice recognition software (Dragon). The accuracy of the dictation is limited by the abilities of the software. I have reviewed the note prior to signing, however some errors in grammar and context are still possible. If you have any questions related to this note please do not hesitate to contact our office.

## 2020-06-12 ENCOUNTER — TELEPHONE (OUTPATIENT)
Dept: BEHAVIORAL HEALTH | Facility: CLINIC | Age: 35
End: 2020-06-12

## 2020-06-12 NOTE — TELEPHONE ENCOUNTER
I called the patient and clarified that the order for lithium was wrong and patient is supposed to take lithium 900 mg after dinner rather than 3 times daily.  Patient reports understanding and he has not began taking the dose.  He will begin the 900 mg dose after dinner today.

## 2020-06-12 NOTE — TELEPHONE ENCOUNTER
Patient called regarding the Lithium. He wanted to confirm that you want him to take 3 tabs 3 times a day as it was sent to the pharmacy. Please advise.     Thank you

## 2020-06-22 ENCOUNTER — TELEMEDICINE (OUTPATIENT)
Dept: BEHAVIORAL HEALTH | Facility: CLINIC | Age: 35
End: 2020-06-22
Payer: COMMERCIAL

## 2020-06-22 VITALS — WEIGHT: 215 LBS | BODY MASS INDEX: 32.58 KG/M2 | HEIGHT: 68 IN

## 2020-06-22 DIAGNOSIS — F33.0 MILD EPISODE OF RECURRENT MAJOR DEPRESSIVE DISORDER (HCC): ICD-10-CM

## 2020-06-22 DIAGNOSIS — F41.1 GENERALIZED ANXIETY DISORDER: ICD-10-CM

## 2020-06-22 DIAGNOSIS — F10.11 ALCOHOL USE DISORDER, MILD, IN EARLY REMISSION, ABUSE: ICD-10-CM

## 2020-06-22 PROCEDURE — 99214 OFFICE O/P EST MOD 30 MIN: CPT | Mod: 95,CR | Performed by: PSYCHIATRY & NEUROLOGY

## 2020-06-22 PROCEDURE — 90833 PSYTX W PT W E/M 30 MIN: CPT | Mod: 95,CR | Performed by: PSYCHIATRY & NEUROLOGY

## 2020-06-22 RX ORDER — CLONAZEPAM 1 MG/1
1 TABLET, ORALLY DISINTEGRATING ORAL
Qty: 30 TAB | Refills: 0 | Status: SHIPPED | OUTPATIENT
Start: 2020-06-22 | End: 2020-07-22

## 2020-06-22 RX ORDER — LITHIUM CARBONATE 300 MG/1
900 CAPSULE ORAL
Qty: 90 CAP | Refills: 0 | Status: SHIPPED | OUTPATIENT
Start: 2020-06-22 | End: 2020-07-22

## 2020-06-22 RX ORDER — SERTRALINE HYDROCHLORIDE 100 MG/1
200 TABLET, FILM COATED ORAL DAILY
Qty: 60 TAB | Refills: 0 | Status: SHIPPED | OUTPATIENT
Start: 2020-06-22 | End: 2020-07-22

## 2020-06-22 ASSESSMENT — PATIENT HEALTH QUESTIONNAIRE - PHQ9
CLINICAL INTERPRETATION OF PHQ2 SCORE: 2
5. POOR APPETITE OR OVEREATING: 0 - NOT AT ALL
SUM OF ALL RESPONSES TO PHQ QUESTIONS 1-9: 9

## 2020-06-22 NOTE — PROGRESS NOTES
This encounter was conducted via Zoom .   Verbal consent was obtained. Patient's identity was verified.    PSYCHIATRY FOLLOW-UP NOTE      Name: Kendall Eckert  MRN: [id number]  : 1985  Age: 35 y.o.  Date of assessment: 2020  PCP: Pcp Pt States None  Persons in attendance: Patient  Total face-to-face time: 25 minutes    REASON FOR VISIT/CHIEF COMPLAINT (as stated by Patient):  Kendall Eckert is a 35 y.o., White male, attending follow-up appointment for mood and anxiety management.      HISTORY OF PRESENT ILLNESS:  Kendall Eckert is a 35 y.o. old male with MDD comes in today for follow up. Patient was last seen 2 weeks ago, and following treatment planning recommendations were done:  · Increase sertraline to 150 mg daily for depression and anxiety management.  Given 1 month supply with no refill.  · Lithium level: 0.12: Increase lithium dose from 600 daily after dinner to 900 mg daily after dinner. Given 15 days supply with no refill.   · Lab work ordered for lithium level, CBC, CMP and TSH: patient did the blood work in [place] & will send me the results on [employer] (when available).  · Continue klonopin 1 mg daily PRN: patient is prescribed this by his PCP: just refilled 1 month supply 2 weeks ago.  No prescription given today as patient has supply at home.  · Motivated to consider psychotherapy or partial hospitalization program but patient is currently not interested as he is in [place].  · Stopped drinking 3 weeks ago: motivated to remain sober.    Patient is compliant with each medication with no acute side effects.  Patient reports feeling slow improvement in mood and energy with improvement in attention.  He also acknowledged that he was able to deal with changes more effectively but continues to report feeling guilty and occasionally hopeless but denies endorsing suicidal or homicidal ideation.  Today is the first session when patient is not endorsing passive death wishes.  His affect also appears  better and this was pointed to the patient.  Patient continues to perseverate that he is still not back to normal but acknowledges that he is showing signs of improvement.  PHQ 9 scale was done and score is 9 indicating mild severity of depression.  Discussed the marked improvement in the scaling with the current treatment.  Patient did agreed with plan of increasing Zoloft to 200 mg daily and not to further increase lithium at this time.  Patient reports getting lithium level done at the wrist lab in [place] and his lab was 0.22.  Patient reports taking lithium at 7 PM and got this lab done at 11 AM.  Discussed that this change in timing might also be contributing to low lithium level but patient is not showing any signs of lithium toxicity or any side effects from lithium at this time.  Patient agreed with plan of not titrating lithium further at this time.    Patient is also awaiting the ketamine strip prescription and has not received this treatment yet.  Patient agreed with plan of not starting this treatment yet and wait for Zoloft and lithium to show efficacy.    Patient continues to utilize Klonopin once every day for anxiety management.  Discussed the negative impact of chronic benzodiazepine use and agreed with plan of considering reduction and hopefully discontinuation in future once his mood is stable.  Patient reports understanding.    Again discussed the plan of considering psychotherapy once patient returns to [place] and he is in agreement.      RESPONSE TO TREATMENT:  Slow improvement      MEDICATION SIDE EFFECTS:  Mild headaches.  No signs of lithium toxicity noted      PSYCHOTHERAPY ASPECT OF SESSION (16 MIN):  • Most part of the session was dedicated to letting patient express his feelings of concern and frustration for timeline medications are taking for onset of response.  Extensive psychoeducation provided on mechanism and timeline each medication takes for onset of response.  Patient  verbalized understanding at this time.  • Patient later discussed recent social stressors including him taking care of his parents in [place].  Validation provided for his appropriate emotional responses and normalization was done.  • We did the discussion on importance of  appropriate emotional responses from intrusive and excessive ones.  • Importance of not discounting the positive was emphasized and discussed how much improvement is noticed since day 1 including patient not having suicidal ideations.  Patient reports understanding.  • Sleep hygiene was again emphasized and patient is following this on a daily basis and reports sleeping for 8 to 10 hours.  • Patient was given positive encouragement for following with doing daily walks.      CURRENT MEDICATIONS:  Current Outpatient Medications   Medication Sig Dispense Refill   • lithium carbonate 300 MG capsule Take 3 Caps by mouth ONE-HALF HOUR AFTER DINNER for 15 days. 45 Cap 0   • Clonazepam 1 MG TABLET DISPERSIBLE TAKE ONE TABLET BY MOUTH EVERY 12 HOURS ICD 10 F40.1 DISPENSE 30 DAYS SUPPLY     • sertraline (ZOLOFT) 100 MG Tab Take 1.5 Tabs by mouth every day for 30 days. 45 Tab 0   • atorvastatin (LIPITOR) 40 MG Tab Take 40 mg by mouth every day.       No current facility-administered medications for this visit.        MEDICAL HISTORY  Past Medical History:   Diagnosis Date   • Depression    • Hyperlipidemia      No past surgical history on file.    PAST PSYCHIATRIC HISTORY  Prior psychiatric hospitalization: March 2020 ([facility])  Prior Self harm/suicide attempt: no  Prior Diagnosis: bipolar 2 disorder, anxiety     PAST PSYCHIATRIC MEDICATIONS  · Fluoxetine  · Wellbutrin-made him more anxious  · Seroquel- not helpful  · Abilify-akathisia  · Seroquel-akathisia  · Risperidone  · Xanax  · Klonopin  · Denies any trial of ECT or TMS     FAMILY HISTORY  Psychiatric diagnosis: Mother with history of depression  History of suicide  "attempts:  no  Substance abuse history:  no     SUBSTANCE USE HISTORY:  ALCOHOL: few beers every other day  TOBACCO: no  CANNABIS: daily \"less amount\" per patient: has reduced the amount from past  OPIOIDS: no  PRESCRIPTION MEDICATIONS: no  OTHERS: no  History of inpatient/outpatient rehab treatment: none     SOCIAL HISTORY  Childhood: born in [place] and describes childhood as ok  Education: Masters in business management  in Special Education: no  Intellectual Disability: no  Employment: Was working in "[employer] but not working due to COVID-19 (laid off)  Relationship: single  Kids: no  Current living situation: lives with parents now  Current/past legal issues: no  History of emotional/physical/sexual abuse - no      REVIEW OF SYSTEMS:        Constitutional negative   Eyes negative   Ears/Nose/Mouth/Throat negative   Cardiovascular negative   Respiratory negative   Gastrointestinal negative   Genitourinary negative   Muscular negative   Integumentary negative   Neurological negative   Endocrine negative   Hematologic/Lymphatic negative     PHYSICAL EXAMINAION:  Vital signs: Ht 1.727 m (5' 8\") Comment: pt stated  Wt 97.5 kg (215 lb) Comment: pt stated  BMI 32.69 kg/m²   Musculoskeletal: Normal gait.   Abnormal movements: none      MENTAL STATUS EXAMINATION      General:   - Grooming and hygiene: Casual,   - Apparent distress: none,   - Behavior: Tense  - Eye Contact:  Good,   - no psychomotor agitation or retardation    - Participation: Active verbal participation  Orientation: Alert and Fully Oriented to person, place and time  Mood: Depressed and Anxious  Affect: Constricted,  Thought Process: Logical and Goal-directed  Thought Content: Denies suicidal or homicidal ideations, intent or plan Within normal limits  Perception: Denies auditory or visual hallucinations. No delusions noted Within normal limits  Attention span and concentration: Intact   Speech:Rate within normal limits and Volume within " normal limits  Language: Appropriate   Insight: Adequate  Judgment: Adequate  Recent and remote memory: No gross evidence of memory deficits        DEPRESSION SCREENING:  Depression Screen (PHQ-2/PHQ-9) 7/7/2019 5/8/2020 5/22/2020   PHQ-2 Total Score 2 - -   PHQ-2 Total Score - 6 5   PHQ-9 Total Score 7 - -   PHQ-9 Total Score - 22 15       Interpretation of PHQ-9 Total Score   Score Severity   1-4 No Depression   5-9 Mild Depression   10-14 Moderate Depression   15-19 Moderately Severe Depression   20-27 Severe Depression    CURRENT RISK:       Suicidal: Low       Homicidal: Low       Self-Harm: Low       Relapse: Low       Crisis Safety Plan Reviewed Not Indicated       If evidence of imminent risk is present, intervention/plan:      MEDICAL RECORDS/LABS/DIAGNOSTIC TESTS REVIEWED:  No new lab since last visit: will await for lithium level to be faxed by his lab in [place].     [place] [facility] records -   Reviewed       DIAGNOSTIC IMPRESSION(S):  · Major depressive disorder, recurrent, severe without psychotic features rule out Bipolar 2 disorder.  · Generalized anxiety disorder  · Cannabis abuse  · Alcohol abuse        PLAN:  (1) Major depressive disorder, recurrent, mild rule out Bipolar 2 disorder  · Slow improvement: PHQ9: 9 today (mild depression severity)  · Increase sertraline to 200 mg daily for depression and anxiety management.  Given 1 month supply with no refill.  · Lithium level: 0.22 (per patient): Continue lithium 900 mg daily after dinner. Given 30 days supply with no refill.   · Motivated to consider psychotherapy but patient is currently not interested as he is in [place].  · Medication options, alternatives (including no medications) and medication risks/benefits/side effects were discussed in detail.  · Explained importance of contraceptive measures while on psychotropic medications, educated to let provider know if ever pregnant or wanting to become pregnant. Verbalized understanding.  · The  patient was advised to call, message provider on [facility], or come in to the clinic if symptoms worsen or if any future questions/issues regarding their medications arise; the patient verbalized understanding and agreement.    · The patient was educated to call 911, call the suicide hotline, or go to local ER if having thoughts of suicide or homicide; verbalized understanding.     (2) Generalized Anxiety disorder  · Slow improvement  · Increase sertraline to 200 mg daily for depression and anxiety management.  Given 1 month supply with no refill.  · Continue klonopin 1 mg daily PRN for severe anxiety and panic attacks management: Given 30 tabs with no refill.  · Motivated to consider psychotherapy or partial hospitalization program but patient is currently not interested as he is in [place].     (3) alcohol abuse and cannabis abuse:  · Stopped drinking 5 weeks ago: motivated to remain sober.      Return to clinic in 4 weeks or sooner if symptoms worsen.  Next Appointment: instruction provided on how to make the next appointment.     The proposed treatment plan was discussed with the patient who was provided the opportunity to ask questions and make suggestions regarding alternative treatment. Patient verbalized understanding and expressed agreement with the plan.       Choco Weathers M.D.  06/22/20    This note was created using voice recognition software (Dragon). The accuracy of the dictation is limited by the abilities of the software. I have reviewed the note prior to signing, however some errors in grammar and context are still possible. If you have any questions related to this note please do not hesitate to contact our office.

## 2020-07-20 ENCOUNTER — TELEMEDICINE (OUTPATIENT)
Dept: BEHAVIORAL HEALTH | Facility: CLINIC | Age: 35
End: 2020-07-20
Payer: COMMERCIAL

## 2020-07-20 VITALS — WEIGHT: 210 LBS | HEIGHT: 68 IN | BODY MASS INDEX: 31.83 KG/M2

## 2020-07-20 DIAGNOSIS — F33.0 MILD EPISODE OF RECURRENT MAJOR DEPRESSIVE DISORDER (HCC): ICD-10-CM

## 2020-07-20 DIAGNOSIS — F41.1 GENERALIZED ANXIETY DISORDER: ICD-10-CM

## 2020-07-20 DIAGNOSIS — F10.11 ALCOHOL USE DISORDER, MILD, IN EARLY REMISSION, ABUSE: ICD-10-CM

## 2020-07-20 PROCEDURE — 99214 OFFICE O/P EST MOD 30 MIN: CPT | Mod: 95,CR | Performed by: PSYCHIATRY & NEUROLOGY

## 2020-07-20 PROCEDURE — 90833 PSYTX W PT W E/M 30 MIN: CPT | Mod: 95,CR | Performed by: PSYCHIATRY & NEUROLOGY

## 2020-07-20 NOTE — PROGRESS NOTES
This encounter was conducted via Zoom .   Verbal consent was obtained. Patient's identity was verified.    PSYCHIATRY FOLLOW-UP NOTE      Name: Kendall Eckert  MRN: [id number]  : 1985  Age: 35 y.o.  Date of assessment: 2020  PCP: Pcp Pt States None  Persons in attendance: Patient  Total face-to-face time: 25 minutes    REASON FOR VISIT/CHIEF COMPLAINT (as stated by Patient):  Kendall Eckert is a 35 y.o., White male, attending follow-up appointment for mood and anxiety management.      HISTORY OF PRESENT ILLNESS:  Kendall Eckert is a 35 y.o. old male with MDD vs bipolar disorder comes in today for follow up. Patient was last seen 1 month ago, and following treatment planning recommendations were done:  · Increase sertraline to 200 mg daily for depression and anxiety management.  Given 1 month supply with no refill.  · Lithium level: 0.22 (per patient): Continue lithium 900 mg daily after dinner. Given 30 days supply with no refill.   · Motivated to consider psychotherapy but patient is currently not interested as he is in [place].    Patient is compliant with above medication but also began taking ketamine strip 100 mg daily: 3 days/week for last 3 weeks.    Patient has noticed a sudden decline in mood and feeling more depressed for last week and denies any social stressors contributing to this.  Patient is compliant with Zoloft and lithium.  Patient reports no new medication, alcohol or substance use.  Patient is mindful of the hydration status with lithium.  Patient reports he is walking 5 to 10 miles every day but reports the sudden onset of depression with low energy, low motivation and poor concentration for last 1 week.  Discussed the risk of mood destabilization with the addition of ketamine.  Discussed that ketamine should help with improvement in mood, energy and motivation with major depressive disorder but with risk of mood destabilization and bipolar disorder could contribute to this recent  decline in depression.  Patient agreed with plan of discontinuing ketamine and to assess if his mood symptoms improve over the next 2 weeks.  Agreed with plan of messaging me on my chart if no improvement is noted so that we can consider increasing the dose of lithium by additional 150 mg and to check lithium level accordingly.  Patient is in agreement with the planning.  We also discussed how his lithium level was 0.2 last time which does not correspond with the dosage he is on currently.  Patient is currently denying any signs of lithium toxicity and was again educated on importance of monitoring for the symptoms moving forward.    Patient continues to utilize Klonopin once every day for anxiety management.  Discussed the negative impact of chronic benzodiazepine use and agreed with plan of considering reduction and hopefully discontinuation in future once his mood is stable.  Patient reports understanding and agreed with plan of taking klonopin 0.5 mg twice daily to assess of low energy & sedation improves.     Again discussed the plan of considering psychotherapy as he is back in [place].  Patient was given the option of considering individual or group therapy at [facility] but he reports that [facility] do not take his insurance and is currently looking for outside therapy.  Patient is also doing online group therapy organized by [facility].    RESPONSE TO TREATMENT:  Decline in depression      MEDICATION SIDE EFFECTS:  none      PSYCHOTHERAPY ASPECT OF SESSION (16 MIN):  • Most part of the session was dedicated to letting patient expresses feelings of concern for sudden decline in mood symptoms.  Extensive psychoeducation provided on timeline medication takes for onset of response and risk of mood destabilization with underlying bipolar disorder.  Patient appears mindful.  Supportive psychotherapy skills provided and validation done for his appropriate emotional responses.  Patient was able to separate appropriate emotional  responses from intrusive and excessive emotional responses.  • Positive encouragement given for patient following these instructions given in last sessions including patient walking for at least 5 to 10 miles every day in the morning time.  Patient is mindful of the hydration status and reports not sweating as he does this walk in the morning time rather than in the afternoon.  • Importance of psychotherapy was emphasized in addition to medication management.  • Discussed various triggers that can destabilize mood including medication noncompliance, substance abuse, decline in sleep and dealing with multiple stressors without support.      CURRENT MEDICATIONS:  Current Outpatient Medications   Medication Sig Dispense Refill   • lithium carbonate 300 MG capsule Take 3 Caps by mouth ONE-HALF HOUR AFTER DINNER for 30 days. 90 Cap 0   • sertraline (ZOLOFT) 100 MG Tab Take 2 Tabs by mouth every day for 30 days. 60 Tab 0   • Clonazepam 1 MG TABLET DISPERSIBLE Take 1 Tab by mouth 1 time daily as needed (severe anxiety only) for up to 30 days. 30 Tab 0   • atorvastatin (LIPITOR) 40 MG Tab Take 40 mg by mouth every day.       No current facility-administered medications for this visit.        MEDICAL HISTORY  Past Medical History:   Diagnosis Date   • Depression    • Hyperlipidemia      No past surgical history on file.    PAST PSYCHIATRIC HISTORY  Prior psychiatric hospitalization: March 2020 ([facility])  Prior Self harm/suicide attempt: no  Prior Diagnosis: bipolar 2 disorder, anxiety     PAST PSYCHIATRIC MEDICATIONS  · Fluoxetine  · Wellbutrin-made him more anxious  · Seroquel- not helpful  · Abilify-akathisia  · Seroquel-akathisia  · Risperidone  · Xanax  · Klonopin  · Denies any trial of ECT or TMS     FAMILY HISTORY  Psychiatric diagnosis: Mother with history of depression  History of suicide attempts:  no  Substance abuse history:  no     SUBSTANCE USE HISTORY:  ALCOHOL: few beers every other  "day  TOBACCO: no  CANNABIS: daily \"less amount\" per patient: has reduced the amount from past  OPIOIDS: no  PRESCRIPTION MEDICATIONS: no  OTHERS: no  History of inpatient/outpatient rehab treatment: none     SOCIAL HISTORY  Childhood: born in [place] and describes childhood as ok  Education: Masters in business management  in Special Education: no  Intellectual Disability: no  Employment: Was working in [employer] but not working due to COVID-19 (laid off)  Relationship: single  Kids: no  Current living situation: lives with parents now  Current/past legal issues: no  History of emotional/physical/sexual abuse - no      REVIEW OF SYSTEMS:        Constitutional negative   Eyes negative   Ears/Nose/Mouth/Throat negative   Cardiovascular negative   Respiratory negative   Gastrointestinal negative   Genitourinary negative   Muscular negative   Integumentary negative   Neurological negative   Endocrine negative   Hematologic/Lymphatic negative     PHYSICAL EXAMINAION:  Vital signs: Ht 1.727 m (5' 8\")   Wt 95.3 kg (210 lb)   BMI 31.93 kg/m²   Musculoskeletal: Normal gait.   Abnormal movements: none      MENTAL STATUS EXAMINATION      General:   - Grooming and hygiene: Casual,   - Apparent distress: none,   - Behavior: Tense  - Eye Contact:  Good,   - no psychomotor agitation or retardation    - Participation: Active verbal participation  Orientation: Alert and Fully Oriented to person, place and time  Mood: Depressed and Anxious  Affect: Constricted,  Thought Process: Logical and Goal-directed  Thought Content: Denies suicidal or homicidal ideations, intent or plan Within normal limits  Perception: Denies auditory or visual hallucinations. No delusions noted Within normal limits  Attention span and concentration: Intact   Speech:Rate within normal limits and Volume within normal limits  Language: Appropriate   Insight: Adequate  Judgment: Adequate  Recent and remote memory: No gross evidence of memory " deficits        DEPRESSION SCREENING:  Depression Screen (PHQ-2/PHQ-9) 5/8/2020 5/22/2020 6/22/2020   PHQ-2 Total Score - - -   PHQ-2 Total Score 6 5 2   PHQ-9 Total Score - - -   PHQ-9 Total Score 22 15 9       Interpretation of PHQ-9 Total Score   Score Severity   1-4 No Depression   5-9 Mild Depression   10-14 Moderate Depression   15-19 Moderately Severe Depression   20-27 Severe Depression    CURRENT RISK:       Suicidal: Low       Homicidal: Low       Self-Harm: Low       Relapse: Low       Crisis Safety Plan Reviewed Not Indicated       If evidence of imminent risk is present, intervention/plan:      MEDICAL RECORDS/LABS/DIAGNOSTIC TESTS REVIEWED:  No new lab since last visit     [place]  records -   Reviewed       DIAGNOSTIC IMPRESSION(S):  · Major depressive disorder, recurrent, severe without psychotic features rule out Bipolar 2 disorder.  · Generalized anxiety disorder  · Cannabis abuse  · Alcohol abuse        PLAN:  (1) Major depressive disorder, recurrent, mild rule out Bipolar 2 disorder  · Worsening of depression for last 1 week  · STOP Ketamine and assess if depression improves over next 2 weeks: (a) If depression improves- continue zoloft 200 mg daily + lithium 900 mg daily; (b) If depression do not improve- continue zoloft 200 mg daily + increase lithium to 1050 mg daily & check lithium level.   · Continue sertraline 200 mg daily for depression and anxiety management. No prescription given as patient have supply at home.  · Lithium level: 0.22 (per patient): Continue lithium 900 mg daily after dinner. No prescription given as patient have supply at home.  · Motivated to consider individual psychotherapy or group psychotherapy but patient reports [facility] do not take Medicaid & he is looking for outside psychotherapy clinic now. Contunue [facility] online group therapy.   · Medication options, alternatives (including no medications) and medication risks/benefits/side effects were discussed in detail.  · The  patient was advised to call, message provider on MyChart, or come in to the clinic if symptoms worsen or if any future questions/issues regarding their medications arise; the patient verbalized understanding and agreement.    · The patient was educated to call 911, call the suicide hotline, or go to local ER if having thoughts of suicide or homicide; verbalized understanding.     (2) Generalized Anxiety disorder  · Slow improvement  · STOP Ketamine and assess if depression improves over next 2 weeks: (a) If depression improves- continue zoloft 200 mg daily + lithium 900 mg daily; (b) If depression do not improve- continue zoloft 200 mg daily + increase lithium to 1050 mg daily & check lithium level.   · Continue sertraline 200 mg daily for depression and anxiety management. No prescription given as patient have supply at home.  · Lithium level: 0.22 (per patient): Continue lithium 900 mg daily after dinner. No prescription given as patient have supply at home.  · Continue Klonopin 1 mg daily PRN: instructed to take 0.5 mg BID PRN to reduce the risk of sedation. No prescription given as patient have supply at home.  · Motivated to consider individual psychotherapy or group psychotherapy but patient reports [facility] do not take Medicaid & he is looking for outside psychotherapy clinic now. Contunue [facility] online group therapy.      (3) alcohol abuse and cannabis abuse:  · Stopped drinking 2 months ago: motivated to remain sober.       Return to clinic in 4 weeks or sooner if symptoms worsen.  Next Appointment: instruction provided on how to make the next appointment.     The proposed treatment plan was discussed with the patient who was provided the opportunity to ask questions and make suggestions regarding alternative treatment. Patient verbalized understanding and expressed agreement with the plan.       Choco Weathers M.D.  07/20/20    This note was created using voice recognition software (Dragon). The accuracy of  the dictation is limited by the abilities of the software. I have reviewed the note prior to signing, however some errors in grammar and context are still possible. If you have any questions related to this note please do not hesitate to contact our office.

## 2020-09-03 ENCOUNTER — TELEMEDICINE (OUTPATIENT)
Dept: BEHAVIORAL HEALTH | Facility: CLINIC | Age: 35
End: 2020-09-03

## 2020-09-03 VITALS — BODY MASS INDEX: 31.83 KG/M2 | WEIGHT: 210 LBS | HEIGHT: 68 IN

## 2020-09-03 DIAGNOSIS — F41.1 GENERALIZED ANXIETY DISORDER: ICD-10-CM

## 2020-09-03 DIAGNOSIS — F33.2 SEVERE EPISODE OF RECURRENT MAJOR DEPRESSIVE DISORDER, WITHOUT PSYCHOTIC FEATURES (HCC): ICD-10-CM

## 2020-09-03 DIAGNOSIS — F10.11 ALCOHOL USE DISORDER, MILD, IN EARLY REMISSION, ABUSE: ICD-10-CM

## 2020-09-03 PROCEDURE — 99214 OFFICE O/P EST MOD 30 MIN: CPT | Mod: 95,CR | Performed by: PSYCHIATRY & NEUROLOGY

## 2020-09-03 PROCEDURE — 96127 BRIEF EMOTIONAL/BEHAV ASSMT: CPT | Mod: 95,CR | Performed by: PSYCHIATRY & NEUROLOGY

## 2020-09-03 RX ORDER — HYDROXYZINE HYDROCHLORIDE 25 MG/1
25 TABLET, FILM COATED ORAL 3 TIMES DAILY PRN
Qty: 90 TAB | Refills: 0 | Status: SHIPPED | OUTPATIENT
Start: 2020-09-03 | End: 2020-09-28

## 2020-09-03 RX ORDER — LITHIUM CARBONATE 300 MG
900 TABLET ORAL
Qty: 90 TAB | Refills: 0 | Status: SHIPPED | OUTPATIENT
Start: 2020-09-03 | End: 2020-10-03

## 2020-09-03 RX ORDER — BUPROPION HYDROCHLORIDE 150 MG/1
150 TABLET ORAL EVERY MORNING
Qty: 30 TAB | Refills: 0 | Status: SHIPPED | OUTPATIENT
Start: 2020-09-03 | End: 2020-09-28

## 2020-09-03 RX ORDER — SERTRALINE HYDROCHLORIDE 100 MG/1
200 TABLET, FILM COATED ORAL DAILY
Qty: 60 TAB | Refills: 0 | Status: SHIPPED | OUTPATIENT
Start: 2020-09-03 | End: 2020-10-03

## 2020-09-03 ASSESSMENT — PATIENT HEALTH QUESTIONNAIRE - PHQ9
5. POOR APPETITE OR OVEREATING: 0 - NOT AT ALL
SUM OF ALL RESPONSES TO PHQ QUESTIONS 1-9: 19
CLINICAL INTERPRETATION OF PHQ2 SCORE: 6

## 2020-09-03 NOTE — PROGRESS NOTES
This evaluation was conducted via Zoom using secure and encrypted videoconferencing technology. The patient was in a private location in the state [facility].    The patient's identity was confirmed and verbal consent was obtained for this virtual visit.    PSYCHIATRY FOLLOW-UP NOTE      Name: Kendall Eckert  MRN: [id number]  : 1985  Age: 35 y.o.  Date of assessment: 9/3/2020  PCP: Pcp Pt States None  Persons in attendance: Patient  Total face-to-face time: 25 minutes    REASON FOR VISIT/CHIEF COMPLAINT (as stated by Patient):  Kendall Eckert is a 35 y.o., White male, attending follow-up appointment for mood and anxiety management.      HISTORY OF PRESENT ILLNESS:  Kendall Eckert is a 35 y.o. old male with MDD and PAO comes in today for follow up. Patient was last seen 1 month ago, and following treatment planning recommendations were done:  · STOP Ketamine and assess if depression improves over next 2 weeks: (a) If depression improves- continue zoloft 200 mg daily + lithium 900 mg daily; (b) If depression do not improve- continue zoloft 200 mg daily + increase lithium to 1050 mg daily & check lithium level.   · Continue sertraline 200 mg daily for depression and anxiety management. No prescription given as patient have supply at home.  · Lithium level: 0.22 (per patient): Continue lithium 900 mg daily after dinner. No prescription given as patient have supply at home.  · Continue Klonopin 1 mg daily PRN: instructed to take 0.5 mg BID PRN to reduce the risk of sedation. No prescription given as patient have supply at home.  · Motivated to consider individual psychotherapy or group psychotherapy but patient reports [facility] do not take Medicaid & he is looking for outside psychotherapy clinic now. Contunue [facility] online group therapy.    Patient is compliant with medication with no side effect but continues to report worsening depression and anxiety symptoms on a daily basis.  Patient reporting prominence of depression  with low energy, low mood with low motivation and anhedonia with feelings of guilt but denies suicidal or homicidal ideation.  Patient denies endorsing hypomanic, manic or psychotic symptoms.  Patient with evident depression and constricted affect during evaluation.  Patient expressed frustration for not getting better.  Patient is on ketamine prescribed by another physician with no improvement.  Patient also reports stopping ketamine for some time but denies having any changes or worsening of mood.  Patient agreed with plan of stopping ketamine and not working with to physician at this time.  Discussed various strategies to help him with low energy and motivation including addition of Wellbutrin or Abilify.  After detailed discussion he agreed with plan of adding Wellbutrin to current combination of Zoloft and lithium.  Discussed the side effect profile of Wellbutrin including decline in sleep, poor appetite and worsening of anxiety.  Patient is compliant with lithium 900 mg daily after dinner with no side effect including any signs of lithium toxicity.  Patient reports getting the lab work done and the results were faxed to us but they were not received in the clinic yet.  Patient agreed with plan of using Klonopin as PRN only for severe anxiety and understand the risk of causing sedation and feeling low energy as a result.  Patient agreed with plan of using hydroxyzine 25 mg for mild to moderate anxiety and using Klonopin for severe or panic attack level anxiety only.    RESPONSE TO TREATMENT:  Not improving      MEDICATION SIDE EFFECTS:  none      PSYCHOTHERAPY ASPECT OF SESSION (16 MIN):  • Most session was dedicated to letting patient express his feelings of concern related to poor response and related social stressors.  Validation provided and normalization was done.  Most session dedicated to implementing supportive psychotherapy skills.  • Motivational interviewing done and motivation provided to continue  "engaging in treatment and psychoeducation provided on the timeline medication can take for onset of response.  • Importance of maintaining physical activity was emphasized and discussed the importance of engaging in healthy diet.  Patient is doing physical activity but do not feel getting enjoyment out of it.  Concept of physical activity resulting in mood changes was again discussed.  • Sleep hygiene instruction were discussed in detail and importance of not taking naps or sleeping during daytime was emphasized.      CURRENT MEDICATIONS:  Current Outpatient Medications   Medication Sig Dispense Refill   • atorvastatin (LIPITOR) 40 MG Tab Take 40 mg by mouth every day.       No current facility-administered medications for this visit.        MEDICAL HISTORY  Past Medical History:   Diagnosis Date   • Depression    • Hyperlipidemia      No past surgical history on file.    PAST PSYCHIATRIC HISTORY  Prior psychiatric hospitalization: March 2020 ([facility])  Prior Self harm/suicide attempt: no  Prior Diagnosis: bipolar 2 disorder, anxiety     PAST PSYCHIATRIC MEDICATIONS  · Fluoxetine  · Wellbutrin-made him more anxious  · Seroquel- not helpful  · Abilify-akathisia  · Seroquel-akathisia  · Risperidone  · Xanax  · Klonopin  · Denies any trial of ECT or TMS     FAMILY HISTORY  Psychiatric diagnosis: Mother with history of depression  History of suicide attempts:  no  Substance abuse history:  no     SUBSTANCE USE HISTORY:  ALCOHOL: few beers every other day  TOBACCO: no  CANNABIS: daily \"less amount\" per patient: has reduced the amount from past  OPIOIDS: no  PRESCRIPTION MEDICATIONS: no  OTHERS: no  History of inpatient/outpatient rehab treatment: none     SOCIAL HISTORY  Childhood: born in [place] and describes childhood as ok  Education: Masters in business management  in Special Education: no  Intellectual Disability: no  Employment: Was working in [employer] but not working due to " "COVID-19 (laid off)  Relationship: single  Kids: no  Current living situation: lives with parents now  Current/past legal issues: no  History of emotional/physical/sexual abuse - no    REVIEW OF SYSTEMS:        Constitutional negative   Eyes negative   Ears/Nose/Mouth/Throat negative   Cardiovascular negative   Respiratory negative   Gastrointestinal negative   Genitourinary negative   Muscular negative   Integumentary negative   Neurological negative   Endocrine negative   Hematologic/Lymphatic negative     PHYSICAL EXAMINAION:  Vital signs: Ht 1.727 m (5' 8\")   Wt 95.3 kg (210 lb)   BMI 31.93 kg/m²   Musculoskeletal: Normal gait.   Abnormal movements: none      MENTAL STATUS EXAMINATION      General:   - Grooming and hygiene: Casual,   - Apparent distress: none,   - Behavior: Tense  - Eye Contact:  Good,   - no psychomotor agitation or retardation    - Participation: Active verbal participation  Orientation: Alert and Fully Oriented to person, place and time  Mood: Depressed and Anxious  Affect: Constricted,  Thought Process: Logical and Goal-directed  Thought Content: Denies suicidal or homicidal ideations, intent or plan Within normal limits  Perception: Denies auditory or visual hallucinations. No delusions noted Within normal limits  Attention span and concentration: Intact   Speech:Rate within normal limits and Volume within normal limits  Language: Appropriate   Insight: Good  Judgment: Good  Recent and remote memory: No gross evidence of memory deficits        DEPRESSION SCREENING:  Depression Screen (PHQ-2/PHQ-9) 5/8/2020 5/22/2020 6/22/2020   PHQ-2 Total Score - - -   PHQ-2 Total Score 6 5 2   PHQ-9 Total Score - - -   PHQ-9 Total Score 22 15 9       Interpretation of PHQ-9 Total Score   Score Severity   1-4 No Depression   5-9 Mild Depression   10-14 Moderate Depression   15-19 Moderately Severe Depression   20-27 Severe Depression    CURRENT RISK:       Suicidal: Low       Homicidal: Low       " Self-Harm: Low       Relapse: Low       Crisis Safety Plan Reviewed Not Indicated       If evidence of imminent risk is present, intervention/plan:      MEDICAL RECORDS/LABS/DIAGNOSTIC TESTS REVIEWED:  No new lab since last visit     [facility] records -   Reviewed       DIAGNOSTIC IMPRESSION(S):  · Major depressive disorder, recurrent, severe without psychotic features rule out Bipolar 2 disorder.  · Generalized anxiety disorder  · Cannabis abuse  · Alcohol abuse        PLAN:  (1) Major depressive disorder, recurrent, mild rule out Bipolar 2 disorder  · Worsening: PHQ9: 10  · STOP Ketamine  · Continue sertraline 200 mg daily for depression and anxiety management. 1 month supply with no refills.  · Add Wellbutrin  mg daily for depression and anxiety management. 1 month supply with no refills.  · Continue lithium 900 mg daily after dinner: Lithium level: 0.22 (per patient). 1 month supply with no refills.  · Motivated to consider individual psychotherapy or group psychotherapy but patient reports [facility] do not take Medicaid & he is looking for outside psychotherapy clinic now. Contunue [facility] online group therapy.   · Medication options, alternatives (including no medications) and medication risks/benefits/side effects were discussed in detail.  · The patient was advised to call, message provider on [employer], or come in to the clinic if symptoms worsen or if any future questions/issues regarding their medications arise; the patient verbalized understanding and agreement.    · The patient was educated to call 911, call the suicide hotline, or go to local ER if having thoughts of suicide or homicide; verbalized understanding.     (2) Generalized Anxiety disorder  · Worsening  · STOP Ketamine  · Continue sertraline 200 mg daily for depression and anxiety management. 1 month supply with no refills.  · Add Wellbutrin  mg daily for depression and anxiety management. 1 month supply with no refills.  · Continue lithium  900 mg daily after dinner: Lithium level: 0.22 (per patient). 1 month supply with no refills.  · Add Hydroxyzine 25 mg TID PRN for mild anxiety. #90 tabs supply with no refills.  · Continue Klonopin 1 mg daily PRN for severe anxiety only: instructed to take 0.5 mg BID PRN to reduce the risk of sedation. No prescription given: recent prescription written by Frantz Andrew filled on 8/25/20 (#60 tabs of clonazepam 1 mg dose).  · Motivated to consider individual psychotherapy or group psychotherapy but patient reports [facility] do not take Medicaid & he is looking for outside psychotherapy clinic now. Contunue [facility] online group therapy.      (3) alcohol abuse and cannabis abuse:  · Stopped drinking 2 months ago: motivated to remain sober.       Return to clinic in 4 weeks or sooner if symptoms worsen.  Next Appointment: instruction provided on how to make the next appointment.     The proposed treatment plan was discussed with the patient who was provided the opportunity to ask questions and make suggestions regarding alternative treatment. Patient verbalized understanding and expressed agreement with the plan.       Choco Weathers M.D.  09/03/20    This note was created using voice recognition software (Dragon). The accuracy of the dictation is limited by the abilities of the software. I have reviewed the note prior to signing, however some errors in grammar and context are still possible. If you have any questions related to this note please do not hesitate to contact our office.

## 2020-09-26 DIAGNOSIS — F33.2 SEVERE EPISODE OF RECURRENT MAJOR DEPRESSIVE DISORDER, WITHOUT PSYCHOTIC FEATURES (HCC): ICD-10-CM

## 2020-09-26 DIAGNOSIS — F41.1 GENERALIZED ANXIETY DISORDER: ICD-10-CM

## 2020-09-28 RX ORDER — BUPROPION HYDROCHLORIDE 150 MG/1
TABLET ORAL
Qty: 30 TAB | Refills: 0 | Status: SHIPPED | OUTPATIENT
Start: 2020-09-28 | End: 2020-10-27

## 2020-09-28 RX ORDER — HYDROXYZINE HYDROCHLORIDE 25 MG/1
25 TABLET, FILM COATED ORAL 3 TIMES DAILY PRN
Qty: 90 TAB | Refills: 0 | Status: SHIPPED | OUTPATIENT
Start: 2020-09-28 | End: 2020-10-27

## 2020-10-27 DIAGNOSIS — F41.1 GENERALIZED ANXIETY DISORDER: ICD-10-CM

## 2020-10-27 DIAGNOSIS — F33.2 SEVERE EPISODE OF RECURRENT MAJOR DEPRESSIVE DISORDER, WITHOUT PSYCHOTIC FEATURES (HCC): ICD-10-CM

## 2020-10-27 RX ORDER — BUPROPION HYDROCHLORIDE 150 MG/1
TABLET ORAL
Qty: 30 TAB | Refills: 0 | Status: SHIPPED | OUTPATIENT
Start: 2020-10-27

## 2020-10-27 RX ORDER — HYDROXYZINE HYDROCHLORIDE 25 MG/1
25 TABLET, FILM COATED ORAL 3 TIMES DAILY PRN
Qty: 90 TAB | Refills: 0 | Status: SHIPPED | OUTPATIENT
Start: 2020-10-27 | End: 2020-11-26